# Patient Record
Sex: MALE | Race: BLACK OR AFRICAN AMERICAN | NOT HISPANIC OR LATINO | Employment: FULL TIME | URBAN - METROPOLITAN AREA
[De-identification: names, ages, dates, MRNs, and addresses within clinical notes are randomized per-mention and may not be internally consistent; named-entity substitution may affect disease eponyms.]

---

## 2023-04-05 LAB
ALANINE AMINOTRANSFERASE (SGPT) (U/L) IN SER/PLAS: NORMAL
ASPARTATE AMINOTRANSFERASE (SGOT) (U/L) IN SER/PLAS: NORMAL

## 2023-04-06 LAB
ALANINE AMINOTRANSFERASE (SGPT) (U/L) IN SER/PLAS: 33 U/L (ref 10–52)
ALBUMIN (G/DL) IN SER/PLAS: 4.3 G/DL (ref 3.4–5)
ALKALINE PHOSPHATASE (U/L) IN SER/PLAS: 67 U/L (ref 33–120)
ANION GAP IN SER/PLAS: 13 MMOL/L (ref 10–20)
ASPARTATE AMINOTRANSFERASE (SGOT) (U/L) IN SER/PLAS: 25 U/L (ref 9–39)
BILIRUBIN TOTAL (MG/DL) IN SER/PLAS: 0.5 MG/DL (ref 0–1.2)
CALCIUM (MG/DL) IN SER/PLAS: 9.6 MG/DL (ref 8.6–10.6)
CARBON DIOXIDE, TOTAL (MMOL/L) IN SER/PLAS: 27 MMOL/L (ref 21–32)
CHLORIDE (MMOL/L) IN SER/PLAS: 103 MMOL/L (ref 98–107)
CHOLESTEROL (MG/DL) IN SER/PLAS: 130 MG/DL (ref 0–199)
CHOLESTEROL IN HDL (MG/DL) IN SER/PLAS: 43.5 MG/DL
CHOLESTEROL/HDL RATIO: 3
CREATININE (MG/DL) IN SER/PLAS: 1.13 MG/DL (ref 0.5–1.3)
ESTIMATED AVERAGE GLUCOSE FOR HBA1C: 192 MG/DL
GFR MALE: 80 ML/MIN/1.73M2
GLUCOSE (MG/DL) IN SER/PLAS: 138 MG/DL (ref 74–99)
HEMOGLOBIN A1C/HEMOGLOBIN TOTAL IN BLOOD: 8.3 %
LDL: 69 MG/DL (ref 0–99)
POTASSIUM (MMOL/L) IN SER/PLAS: 3.9 MMOL/L (ref 3.5–5.3)
PROTEIN TOTAL: 7.2 G/DL (ref 6.4–8.2)
SODIUM (MMOL/L) IN SER/PLAS: 139 MMOL/L (ref 136–145)
TRIGLYCERIDE (MG/DL) IN SER/PLAS: 88 MG/DL (ref 0–149)
UREA NITROGEN (MG/DL) IN SER/PLAS: 10 MG/DL (ref 6–23)
VLDL: 18 MG/DL (ref 0–40)

## 2023-05-10 ENCOUNTER — TELEPHONE (OUTPATIENT)
Dept: PRIMARY CARE | Facility: CLINIC | Age: 48
End: 2023-05-10
Payer: COMMERCIAL

## 2023-05-22 ENCOUNTER — APPOINTMENT (OUTPATIENT)
Dept: PRIMARY CARE | Facility: CLINIC | Age: 48
End: 2023-05-22
Payer: COMMERCIAL

## 2023-07-31 DIAGNOSIS — I10 BENIGN ESSENTIAL HYPERTENSION: ICD-10-CM

## 2023-07-31 DIAGNOSIS — E08.00 DIABETES MELLITUS DUE TO UNDERLYING CONDITION WITH HYPEROSMOLARITY WITHOUT COMA, WITHOUT LONG-TERM CURRENT USE OF INSULIN (MULTI): ICD-10-CM

## 2023-07-31 DIAGNOSIS — E03.9 HYPOTHYROIDISM, UNSPECIFIED TYPE: ICD-10-CM

## 2023-07-31 PROBLEM — Z86.39 HISTORY OF HYPOTHYROIDISM: Status: ACTIVE | Noted: 2023-07-31

## 2023-07-31 PROBLEM — E78.5 HYPERLIPIDEMIA: Status: ACTIVE | Noted: 2023-07-31

## 2023-07-31 RX ORDER — LISINOPRIL 40 MG/1
40 TABLET ORAL DAILY
Qty: 30 TABLET | Refills: 0 | Status: SHIPPED | OUTPATIENT
Start: 2023-07-31 | End: 2023-08-10 | Stop reason: SDUPTHER

## 2023-07-31 RX ORDER — LISINOPRIL 40 MG/1
40 TABLET ORAL DAILY
COMMUNITY
End: 2023-07-31 | Stop reason: SDUPTHER

## 2023-07-31 RX ORDER — METFORMIN HYDROCHLORIDE 1000 MG/1
1000 TABLET ORAL 2 TIMES DAILY
Qty: 60 TABLET | Refills: 0 | Status: SHIPPED | OUTPATIENT
Start: 2023-07-31 | End: 2023-08-10 | Stop reason: SDUPTHER

## 2023-07-31 RX ORDER — LEVOTHYROXINE SODIUM 50 UG/1
50 TABLET ORAL DAILY
Qty: 30 TABLET | Refills: 0 | Status: SHIPPED | OUTPATIENT
Start: 2023-07-31 | End: 2023-08-10 | Stop reason: SDUPTHER

## 2023-08-10 ENCOUNTER — OFFICE VISIT (OUTPATIENT)
Dept: PRIMARY CARE | Facility: CLINIC | Age: 48
End: 2023-08-10
Payer: COMMERCIAL

## 2023-08-10 VITALS
DIASTOLIC BLOOD PRESSURE: 82 MMHG | BODY MASS INDEX: 31.52 KG/M2 | SYSTOLIC BLOOD PRESSURE: 130 MMHG | WEIGHT: 208 LBS | HEIGHT: 68 IN

## 2023-08-10 DIAGNOSIS — E03.9 HYPOTHYROIDISM, UNSPECIFIED TYPE: ICD-10-CM

## 2023-08-10 DIAGNOSIS — R73.03 PRE-DIABETES: ICD-10-CM

## 2023-08-10 DIAGNOSIS — M54.9 BACK PAIN, UNSPECIFIED BACK LOCATION, UNSPECIFIED BACK PAIN LATERALITY, UNSPECIFIED CHRONICITY: ICD-10-CM

## 2023-08-10 DIAGNOSIS — Z12.5 ENCOUNTER FOR PROSTATE CANCER SCREENING: ICD-10-CM

## 2023-08-10 DIAGNOSIS — I10 BENIGN ESSENTIAL HYPERTENSION: ICD-10-CM

## 2023-08-10 DIAGNOSIS — E08.00 DIABETES MELLITUS DUE TO UNDERLYING CONDITION WITH HYPEROSMOLARITY WITHOUT COMA, WITHOUT LONG-TERM CURRENT USE OF INSULIN (MULTI): ICD-10-CM

## 2023-08-10 DIAGNOSIS — E78.5 HYPERLIPIDEMIA, UNSPECIFIED HYPERLIPIDEMIA TYPE: ICD-10-CM

## 2023-08-10 DIAGNOSIS — Z86.39 HISTORY OF HYPOTHYROIDISM: ICD-10-CM

## 2023-08-10 LAB
ALANINE AMINOTRANSFERASE (SGPT) (U/L) IN SER/PLAS: 30 U/L (ref 10–52)
ALBUMIN (G/DL) IN SER/PLAS: 4.4 G/DL (ref 3.4–5)
ALKALINE PHOSPHATASE (U/L) IN SER/PLAS: 59 U/L (ref 33–120)
ANION GAP IN SER/PLAS: 13 MMOL/L (ref 10–20)
APPEARANCE, URINE: CLEAR
ASPARTATE AMINOTRANSFERASE (SGOT) (U/L) IN SER/PLAS: 26 U/L (ref 9–39)
BILIRUBIN TOTAL (MG/DL) IN SER/PLAS: 0.8 MG/DL (ref 0–1.2)
BILIRUBIN, URINE: NEGATIVE
BLOOD, URINE: NEGATIVE
CALCIUM (MG/DL) IN SER/PLAS: 9.6 MG/DL (ref 8.6–10.6)
CARBON DIOXIDE, TOTAL (MMOL/L) IN SER/PLAS: 25 MMOL/L (ref 21–32)
CHLORIDE (MMOL/L) IN SER/PLAS: 106 MMOL/L (ref 98–107)
CHOLESTEROL (MG/DL) IN SER/PLAS: 123 MG/DL (ref 0–199)
CHOLESTEROL IN HDL (MG/DL) IN SER/PLAS: 46.1 MG/DL
CHOLESTEROL/HDL RATIO: 2.7
COLOR, URINE: YELLOW
CREATININE (MG/DL) IN SER/PLAS: 0.94 MG/DL (ref 0.5–1.3)
GFR MALE: >90 ML/MIN/1.73M2
GLUCOSE (MG/DL) IN SER/PLAS: 117 MG/DL (ref 74–99)
GLUCOSE, URINE: ABNORMAL MG/DL
KETONES, URINE: NEGATIVE MG/DL
LDL: 66 MG/DL (ref 0–99)
LEUKOCYTE ESTERASE, URINE: NEGATIVE
NITRITE, URINE: NEGATIVE
PH, URINE: 5 (ref 5–8)
POTASSIUM (MMOL/L) IN SER/PLAS: 3.8 MMOL/L (ref 3.5–5.3)
PROSTATE SPECIFIC AG (NG/ML) IN SER/PLAS: 1.94 NG/ML (ref 0–4)
PROTEIN TOTAL: 7.4 G/DL (ref 6.4–8.2)
PROTEIN, URINE: NEGATIVE MG/DL
SODIUM (MMOL/L) IN SER/PLAS: 140 MMOL/L (ref 136–145)
SPECIFIC GRAVITY, URINE: 1.02 (ref 1–1.03)
THYROTROPIN (MIU/L) IN SER/PLAS BY DETECTION LIMIT <= 0.05 MIU/L: 1.39 MIU/L (ref 0.44–3.98)
TRIGLYCERIDE (MG/DL) IN SER/PLAS: 57 MG/DL (ref 0–149)
UREA NITROGEN (MG/DL) IN SER/PLAS: 9 MG/DL (ref 6–23)
UROBILINOGEN, URINE: <2 MG/DL (ref 0–1.9)
VLDL: 11 MG/DL (ref 0–40)

## 2023-08-10 PROCEDURE — 3075F SYST BP GE 130 - 139MM HG: CPT | Performed by: INTERNAL MEDICINE

## 2023-08-10 PROCEDURE — 99213 OFFICE O/P EST LOW 20 MIN: CPT | Performed by: INTERNAL MEDICINE

## 2023-08-10 PROCEDURE — 3052F HG A1C>EQUAL 8.0%<EQUAL 9.0%: CPT | Performed by: INTERNAL MEDICINE

## 2023-08-10 PROCEDURE — 3079F DIAST BP 80-89 MM HG: CPT | Performed by: INTERNAL MEDICINE

## 2023-08-10 PROCEDURE — 4010F ACE/ARB THERAPY RXD/TAKEN: CPT | Performed by: INTERNAL MEDICINE

## 2023-08-10 RX ORDER — METFORMIN HYDROCHLORIDE 1000 MG/1
1000 TABLET ORAL 2 TIMES DAILY
Qty: 180 TABLET | Refills: 0 | Status: SHIPPED | OUTPATIENT
Start: 2023-08-10 | End: 2024-02-03 | Stop reason: SDUPTHER

## 2023-08-10 RX ORDER — CYCLOBENZAPRINE HCL 10 MG
10 TABLET ORAL NIGHTLY PRN
Qty: 90 TABLET | Refills: 1 | Status: SHIPPED | OUTPATIENT
Start: 2023-08-10 | End: 2024-02-06

## 2023-08-10 RX ORDER — PREDNISONE 10 MG/1
10 TABLET ORAL DAILY
Qty: 21 TABLET | Refills: 0 | Status: SHIPPED | OUTPATIENT
Start: 2023-08-10 | End: 2024-01-10 | Stop reason: ALTCHOICE

## 2023-08-10 RX ORDER — LISINOPRIL 40 MG/1
40 TABLET ORAL DAILY
Qty: 90 TABLET | Refills: 0 | Status: SHIPPED | OUTPATIENT
Start: 2023-08-10 | End: 2024-02-03 | Stop reason: SDUPTHER

## 2023-08-10 RX ORDER — NABUMETONE 750 MG/1
750 TABLET, FILM COATED ORAL 2 TIMES DAILY
Qty: 60 TABLET | Refills: 5 | Status: SHIPPED | OUTPATIENT
Start: 2023-08-10 | End: 2024-01-10 | Stop reason: ALTCHOICE

## 2023-08-10 RX ORDER — LEVOTHYROXINE SODIUM 50 UG/1
50 TABLET ORAL DAILY
Qty: 90 TABLET | Refills: 0 | Status: SHIPPED | OUTPATIENT
Start: 2023-08-10 | End: 2023-09-12 | Stop reason: SDUPTHER

## 2023-08-10 ASSESSMENT — ENCOUNTER SYMPTOMS
DEPRESSION: 0
OCCASIONAL FEELINGS OF UNSTEADINESS: 0
LOSS OF SENSATION IN FEET: 0

## 2023-08-10 NOTE — PROGRESS NOTES
OFFICE NOTE    NAME OF THE PATIENT: Buster Sims    YOB: 1975    CHIEF COMPLAINT:  This gentleman today came here for multiple medical issues.  Good news is that he lost weight on Ozempic.  Bad news is that his back pain flared up.  Severe back pain going to the right leg, tingling and numbness.  He came here for follow-up on various conditions.  Weak, tired, fatigued, and exhausted.  No loss of control in urine and bowel.  He came here for follow-up on various conditions.    PAST MEDICAL HISTORY:  Reviewed on EMR, unchanged.    CURRENT MEDICATIONS:  Reviewed on EMR, unchanged.  List reviewed.    ALLERGIES:  Reviewed on EMR, unchanged.    SOCIAL HISTORY:  Reviewed on EMR, unchanged.  He does not smoke and does not drink alcohol.    FAMILY HISTORY:  Reviewed on EMR, unchanged.    REVIEW OF SYSTEMS:  All 12 systems reviewed and pertaining covered in history and physical.    PHYSICAL EXAMINATION  VITAL SIGNS:  As recorded and reviewed from EMR.  EYES:  The patient's sclerae white.  The pupils were equal and round.  ENT:  The patient's external ears were normal and the otoscopic examination was negative.  RESPIRATORY:  The patient had normal inspirations and expirations.  The breath sounds were equal bilaterally and clear to auscultation.  CARDIOVASCULAR:  The patient had S1 normal, split S2 without obvious rubs, clicks, or murmurs.    GASTROINTESTINAL:  There was no hepatosplenomegaly.  There were no palpable masses and no inguinal nodes.  EXTREMITIES:  Legs had no edema.  NEUROLOGIC:  The patient had normal cranial nerves.  The reflexes, sensory, and motor examination were grossly within normal limits.  BACK:  Movements painful.  Straight leg raise limited.    LAB WORK:  Laboratory testing discussed.    ASSESSMENT AND PLAN:  Back pain with lumbar radiculopathy.  Advised Relafen, Flexeril, physical therapy and tapering prednisone.  Type 2 diabetes.  Ozempic did a good job.  Lost  "weight.  Hypertension, okay.  High cholesterol, okay.  Today's blood work, we will communicate over the phone.  If necessary, I will see him in four to six weeks.  Otherwise, routine check in three months.  Prednisone will make sugar go up.      Kindly review this note in conjunction with EMR.     Subjective   Patient ID: Buster Sims is a 47 y.o. male who presents for Follow-up.      HPI    Review of Systems    Objective   /82   Ht 1.727 m (5' 8\")   Wt 94.3 kg (208 lb)   BMI 31.63 kg/m²       Physical Exam    Assessment/Plan   Problem List Items Addressed This Visit       Hyperlipidemia    Relevant Orders    Comprehensive metabolic panel    Lipid panel    History of hypothyroidism    Relevant Orders    TSH    Benign essential hypertension    Relevant Orders    CBC    Urinalysis with Reflex Microscopic     Other Visit Diagnoses       Encounter for prostate cancer screening        Relevant Orders    Prostate Spec.Ag,Screen    Pre-diabetes        Relevant Orders    Hemoglobin A1c    Diabetes mellitus due to underlying condition with hyperosmolarity without coma, without long-term current use of insulin (CMS/[facility])        Hypothyroidism, unspecified type                  "

## 2023-08-11 LAB
ERYTHROCYTE DISTRIBUTION WIDTH (RATIO) BY AUTOMATED COUNT: 14.8 % (ref 11.5–14.5)
ERYTHROCYTE MEAN CORPUSCULAR HEMOGLOBIN CONCENTRATION (G/DL) BY AUTOMATED: 32.3 G/DL (ref 32–36)
ERYTHROCYTE MEAN CORPUSCULAR VOLUME (FL) BY AUTOMATED COUNT: 88 FL (ref 80–100)
ERYTHROCYTES (10*6/UL) IN BLOOD BY AUTOMATED COUNT: 5.32 X10E12/L (ref 4.5–5.9)
ESTIMATED AVERAGE GLUCOSE FOR HBA1C: 174 MG/DL
HEMATOCRIT (%) IN BLOOD BY AUTOMATED COUNT: 46.7 % (ref 41–52)
HEMOGLOBIN (G/DL) IN BLOOD: 15.1 G/DL (ref 13.5–17.5)
HEMOGLOBIN A1C/HEMOGLOBIN TOTAL IN BLOOD: 7.7 %
LEUKOCYTES (10*3/UL) IN BLOOD BY AUTOMATED COUNT: 8.7 X10E9/L (ref 4.4–11.3)
NRBC (PER 100 WBCS) BY AUTOMATED COUNT: 0 /100 WBC (ref 0–0)
PLATELETS (10*3/UL) IN BLOOD AUTOMATED COUNT: 270 X10E9/L (ref 150–450)

## 2023-09-12 DIAGNOSIS — E03.9 HYPOTHYROIDISM, UNSPECIFIED TYPE: ICD-10-CM

## 2023-09-12 RX ORDER — LEVOTHYROXINE SODIUM 50 UG/1
50 TABLET ORAL DAILY
Qty: 90 TABLET | Refills: 0 | Status: SHIPPED | OUTPATIENT
Start: 2023-09-12 | End: 2024-02-03 | Stop reason: SDUPTHER

## 2023-10-04 ENCOUNTER — TREATMENT (OUTPATIENT)
Dept: PHYSICAL THERAPY | Facility: CLINIC | Age: 48
End: 2023-10-04
Payer: COMMERCIAL

## 2023-10-04 DIAGNOSIS — M54.50 CHRONIC BILATERAL LOW BACK PAIN WITHOUT SCIATICA: Primary | ICD-10-CM

## 2023-10-04 DIAGNOSIS — G89.29 CHRONIC BILATERAL LOW BACK PAIN WITHOUT SCIATICA: Primary | ICD-10-CM

## 2023-10-04 PROBLEM — M54.9 BACK PAIN: Status: ACTIVE | Noted: 2023-10-04

## 2023-10-04 PROCEDURE — 97110 THERAPEUTIC EXERCISES: CPT | Mod: GP | Performed by: PHYSICAL THERAPIST

## 2023-10-04 NOTE — PROGRESS NOTES
Physical Therapy    Physical Therapy Treatment    Patient Name: Buster Sims  MRN: [id number]  Today's Date: 10/4/2023  Visit #: 3    Time Calculation  Start Time: 0520  Stop Time: 0600  Time Calculation (min): 40 min      Assessment: Patient with decreasing radicular pain.   Patient able to progress exercises without pain or difficutly.         Plan: Continue with POC.        Current Problem  1. Chronic bilateral low back pain without sciatica            Subjective      Precautions: HTN, DM     Pain:    5/10 at worst last 2 days   Worse first thing in the am and end of work day  Feels better after doing stretches   Less pain down leg    Objective       Treatments:  Therapeutic exercises:  SciFit stepper 20X   Calf stretch of step 10X   Resisted shoulder retraction and extension with green threaband 20X each  Hamstring stretch with strap 10X  HLR 20X  Prone press ups 20X   Prone leg lifts, arm lifts and opposite arm/leg lifts 10X each  (40 minutes)             Goals: We are continuing the therapy plan of care and goals that were documented in the legacy EMR.  Please refer to the PT (or OT) plan of care in the EMR for treatment plan and goals

## 2023-10-10 PROBLEM — I25.10 ATHEROSCLEROSIS OF NATIVE CORONARY ARTERY OF NATIVE HEART WITHOUT ANGINA PECTORIS: Status: ACTIVE | Noted: 2023-10-10

## 2023-10-10 PROBLEM — E11.65 POORLY CONTROLLED TYPE 2 DIABETES MELLITUS (MULTI): Status: ACTIVE | Noted: 2023-10-10

## 2023-10-10 PROBLEM — R37 SEXUAL DYSFUNCTION: Status: ACTIVE | Noted: 2023-10-10

## 2023-10-10 PROBLEM — M25.561 KNEE PAIN, BILATERAL: Status: ACTIVE | Noted: 2023-10-10

## 2023-10-10 PROBLEM — E78.00 LOW-DENSITY-LIPOID-TYPE (LDL) HYPERLIPOPROTEINEMIA: Status: ACTIVE | Noted: 2023-10-10

## 2023-10-10 PROBLEM — R00.2 PALPITATION: Status: ACTIVE | Noted: 2023-10-10

## 2023-10-10 PROBLEM — M25.562 KNEE PAIN, BILATERAL: Status: ACTIVE | Noted: 2023-10-10

## 2023-10-10 PROBLEM — M25.569 JOINT PAIN, KNEE: Status: ACTIVE | Noted: 2023-10-10

## 2023-10-10 PROBLEM — E66.9 CLASS 1 OBESITY WITH BODY MASS INDEX (BMI) OF 32.0 TO 32.9 IN ADULT: Status: ACTIVE | Noted: 2023-10-10

## 2023-10-10 PROBLEM — R01.1 HEART MURMUR: Status: ACTIVE | Noted: 2023-10-10

## 2023-10-10 PROBLEM — E66.811 CLASS 1 OBESITY WITH BODY MASS INDEX (BMI) OF 32.0 TO 32.9 IN ADULT: Status: ACTIVE | Noted: 2023-10-10

## 2023-10-10 PROBLEM — M79.604 RIGHT LEG PAIN: Status: ACTIVE | Noted: 2023-10-10

## 2023-10-10 PROBLEM — R93.1 ABNORMAL HEART SCORE CT: Status: ACTIVE | Noted: 2023-10-10

## 2023-10-10 PROBLEM — E34.9 TESTOSTERONE INSUFFICIENCY: Status: ACTIVE | Noted: 2023-10-10

## 2023-10-10 RX ORDER — ATORVASTATIN CALCIUM 20 MG/1
TABLET, FILM COATED ORAL
COMMUNITY
Start: 2022-04-20 | End: 2024-01-10 | Stop reason: ALTCHOICE

## 2023-10-10 RX ORDER — GLIMEPIRIDE 4 MG/1
1 TABLET ORAL 2 TIMES DAILY
COMMUNITY
Start: 2017-02-22

## 2023-10-10 RX ORDER — ATORVASTATIN CALCIUM 40 MG/1
20 TABLET, FILM COATED ORAL DAILY
COMMUNITY
Start: 2022-04-20

## 2023-10-10 RX ORDER — EZETIMIBE 10 MG/1
1 TABLET ORAL NIGHTLY
COMMUNITY
Start: 2022-03-28 | End: 2024-04-15

## 2023-10-10 RX ORDER — SEMAGLUTIDE 1.34 MG/ML
INJECTION, SOLUTION SUBCUTANEOUS
COMMUNITY
End: 2023-12-21 | Stop reason: WASHOUT

## 2023-10-10 RX ORDER — SILDENAFIL 100 MG/1
100 TABLET, FILM COATED ORAL AS NEEDED
COMMUNITY

## 2023-10-10 RX ORDER — NAPROXEN SODIUM 220 MG/1
1 TABLET, FILM COATED ORAL DAILY
COMMUNITY
Start: 2022-04-20

## 2023-10-10 RX ORDER — METOPROLOL TARTRATE 25 MG/1
1 TABLET, FILM COATED ORAL 2 TIMES DAILY
COMMUNITY
Start: 2022-03-28 | End: 2024-04-12 | Stop reason: SDUPTHER

## 2023-10-10 RX ORDER — EMPAGLIFLOZIN 25 MG/1
1 TABLET, FILM COATED ORAL DAILY
COMMUNITY
Start: 2020-06-13 | End: 2024-04-28

## 2023-10-10 RX ORDER — SEMAGLUTIDE 2.68 MG/ML
2 INJECTION, SOLUTION SUBCUTANEOUS
COMMUNITY
Start: 2019-03-22 | End: 2023-12-21 | Stop reason: WASHOUT

## 2023-10-10 RX ORDER — SIMVASTATIN 40 MG/1
40 TABLET, FILM COATED ORAL NIGHTLY
COMMUNITY
End: 2024-01-10 | Stop reason: ALTCHOICE

## 2023-10-11 ENCOUNTER — TREATMENT (OUTPATIENT)
Dept: PHYSICAL THERAPY | Facility: CLINIC | Age: 48
End: 2023-10-11
Payer: COMMERCIAL

## 2023-10-11 DIAGNOSIS — G89.29 CHRONIC BILATERAL LOW BACK PAIN WITHOUT SCIATICA: Primary | ICD-10-CM

## 2023-10-11 DIAGNOSIS — M54.50 CHRONIC BILATERAL LOW BACK PAIN WITHOUT SCIATICA: Primary | ICD-10-CM

## 2023-10-11 PROCEDURE — 97110 THERAPEUTIC EXERCISES: CPT | Mod: GP | Performed by: PHYSICAL THERAPIST

## 2023-10-11 NOTE — PROGRESS NOTES
Physical Therapy    Physical Therapy Treatment    Patient Name: Buster Sims  MRN: [id number]  Today's Date: 10/11/2023  Visit #: 4       Assessment:  Patient with increase radicular symptoms this week.          Plan: Continue with POC.   Reassess next visit        Current Problem  1. Chronic bilateral low back pain without sciatica            Subjective      Precautions: HTN, DM     Pain:    6-7/10 at worst last 2 days - pain bilateral evertors - spasms   No aggravating factor   Feels better after doing stretches   Working second job at [employer]      Objective       Treatments:  Therapeutic exercises:  SciFit stepper 10 minutes  Step stretch 20X   Calf stretch of step 10X   Resisted shoulder retraction and extension with green threaband 20X each  Hamstring stretch with strap 10X  Gluteal stretch with strap 10X  HLR 20X  Theraball KTC with strap 20X   Prone press ups 20X   (45 minutes)             Goals: We are continuing the therapy plan of care and goals that were documented in the legacy EMR.  Please refer to the PT (or OT) plan of care in the EMR for treatment plan and goals

## 2023-10-18 ENCOUNTER — APPOINTMENT (OUTPATIENT)
Dept: PHYSICAL THERAPY | Facility: CLINIC | Age: 48
End: 2023-10-18
Payer: COMMERCIAL

## 2023-12-20 ENCOUNTER — APPOINTMENT (OUTPATIENT)
Dept: CARDIOLOGY | Facility: HOSPITAL | Age: 48
End: 2023-12-20
Payer: COMMERCIAL

## 2023-12-21 ENCOUNTER — OFFICE VISIT (OUTPATIENT)
Dept: ENDOCRINOLOGY | Facility: CLINIC | Age: 48
End: 2023-12-21
Payer: COMMERCIAL

## 2023-12-21 VITALS
HEART RATE: 79 BPM | DIASTOLIC BLOOD PRESSURE: 92 MMHG | RESPIRATION RATE: 20 BRPM | SYSTOLIC BLOOD PRESSURE: 140 MMHG | WEIGHT: 213.41 LBS | BODY MASS INDEX: 29.88 KG/M2 | HEIGHT: 71 IN

## 2023-12-21 DIAGNOSIS — E11.9 TYPE 2 DIABETES MELLITUS WITHOUT COMPLICATION, WITHOUT LONG-TERM CURRENT USE OF INSULIN (MULTI): Primary | ICD-10-CM

## 2023-12-21 PROCEDURE — 99214 OFFICE O/P EST MOD 30 MIN: CPT | Performed by: INTERNAL MEDICINE

## 2023-12-21 PROCEDURE — 3080F DIAST BP >= 90 MM HG: CPT | Performed by: INTERNAL MEDICINE

## 2023-12-21 PROCEDURE — 1036F TOBACCO NON-USER: CPT | Performed by: INTERNAL MEDICINE

## 2023-12-21 PROCEDURE — 4010F ACE/ARB THERAPY RXD/TAKEN: CPT | Performed by: INTERNAL MEDICINE

## 2023-12-21 PROCEDURE — 3077F SYST BP >= 140 MM HG: CPT | Performed by: INTERNAL MEDICINE

## 2023-12-21 PROCEDURE — 3051F HG A1C>EQUAL 7.0%<8.0%: CPT | Performed by: INTERNAL MEDICINE

## 2023-12-21 RX ORDER — SEMAGLUTIDE 2.68 MG/ML
2 INJECTION, SOLUTION SUBCUTANEOUS
Qty: 9 ML | Refills: 3 | Status: SHIPPED | OUTPATIENT
Start: 2023-12-21 | End: 2024-12-20

## 2023-12-21 ASSESSMENT — COLUMBIA-SUICIDE SEVERITY RATING SCALE - C-SSRS
1. IN THE PAST MONTH, HAVE YOU WISHED YOU WERE DEAD OR WISHED YOU COULD GO TO SLEEP AND NOT WAKE UP?: NO
2. HAVE YOU ACTUALLY HAD ANY THOUGHTS OF KILLING YOURSELF?: NO
6. HAVE YOU EVER DONE ANYTHING, STARTED TO DO ANYTHING, OR PREPARED TO DO ANYTHING TO END YOUR LIFE?: NO

## 2023-12-21 ASSESSMENT — ENCOUNTER SYMPTOMS
NAUSEA: 0
COUGH: 0
DIARRHEA: 0
VOMITING: 0
UNEXPECTED WEIGHT CHANGE: 0
ENDOCRINE COMMENTS: AS ABOVE

## 2023-12-21 ASSESSMENT — PATIENT HEALTH QUESTIONNAIRE - PHQ9
SUM OF ALL RESPONSES TO PHQ9 QUESTIONS 1 AND 2: 0
2. FEELING DOWN, DEPRESSED OR HOPELESS: NOT AT ALL
1. LITTLE INTEREST OR PLEASURE IN DOING THINGS: NOT AT ALL

## 2023-12-21 ASSESSMENT — PAIN SCALES - GENERAL: PAINLEVEL: 0-NO PAIN

## 2023-12-21 NOTE — PATIENT INSTRUCTIONS
RECOMMENDATIONS  Labs as ordered by Dr. Fulton  Follow up 6 months  Results available on [facility]  Call/message if you have not received results in 3 days.     Bring written glucose record (2 weeks' worth) to all appointments.   Repeat A1c before next appointment

## 2023-12-21 NOTE — PROGRESS NOTES
History Of Present Illness  Buster Sims is a 48 y.o. male     Duration of type 2 diabetes mellitus:  19 years  Complications:  cardiovascular disease    Glimepiride 4 mg BID  Metformin 1000 mg BID  Ozempic 2 mg/week  Jardiance 25 mg/day    Patient is testing glucose 2 times daily  Records reviewed on phone, no written record    Last eye exam:  August 2023    Past Medical History  He has a past medical history of Encounter for screening for malignant neoplasm of prostate (04/18/2018), Infectious gastroenteritis and colitis, unspecified (04/16/2016), Other conditions influencing health status (12/16/2016), Pain in left shoulder (12/16/2016), Personal history of other (healed) physical injury and trauma (03/27/2015), Personal history of other diseases of the respiratory system (09/19/2015), and Type 2 diabetes mellitus with unspecified complications (CMS/[facility]) (05/18/2013).    Surgical History  He has a past surgical history that includes Other surgical history (05/18/2013).     Social History  He reports that he has never smoked. He has never used smokeless tobacco. He reports that he does not drink alcohol and does not use drugs.    Family History  Family History   Problem Relation Name Age of Onset    Diabetes Mother      Hyperlipidemia Mother      Hypertension Mother      Hypertension Father      Diabetes Father      Hyperlipidemia Father      Arthritis Paternal Grandmother         Medications  Current Outpatient Medications   Medication Instructions    aspirin 81 mg chewable tablet 1 tablet, oral, Daily    atorvastatin (Lipitor) 20 mg tablet oral    atorvastatin (LIPITOR) 40 mg, oral, Nightly    cyclobenzaprine (FLEXERIL) 10 mg, oral, Nightly PRN    ezetimibe (Zetia) 10 mg tablet 1 tablet, oral, Nightly    glimepiride (Amaryl) 4 mg tablet 1 tablet, oral, 2 times daily    Jardiance 25 mg 1 tablet, oral, Daily    levothyroxine (SYNTHROID, LEVOXYL) 50 mcg, oral, Daily    lisinopril 40 mg, oral, Daily     "metFORMIN (GLUCOPHAGE) 1,000 mg, oral, 2 times daily    metoprolol tartrate (Lopressor) 25 mg tablet 1 tablet, oral, 2 times daily    nabumetone (RELAFEN) 750 mg, oral, 2 times daily    Ozempic 2 mg, subcutaneous, Weekly    predniSONE (DELTASONE) 10 mg, oral, Daily    semaglutide (Ozempic) 1 mg/dose (4 mg/3 mL) pen injector subcutaneous, Weekly    sildenafil (VIAGRA) 100 mg, oral, As needed    simvastatin (ZOCOR) 40 mg, oral, Nightly       Allergies  Oxycodone-acetaminophen    Review of Systems   Constitutional:  Negative for unexpected weight change.   Eyes:  Negative for visual disturbance.   Respiratory:  Negative for cough.    Gastrointestinal:  Negative for diarrhea, nausea and vomiting.   Endocrine:        As above         Last Recorded Vitals  Blood pressure (!) 140/92, pulse 79, resp. rate 20, height 1.803 m (5' 11\"), weight 96.8 kg (213 lb 6.5 oz).    Physical Exam  Constitutional:       General: He is not in acute distress.  HENT:      Head: Normocephalic.      Mouth/Throat:      Mouth: Mucous membranes are moist.   Eyes:      Extraocular Movements: Extraocular movements intact.   Neck:      Thyroid: No thyromegaly.   Cardiovascular:      Pulses:           Radial pulses are 2+ on the right side and 2+ on the left side.        Dorsalis pedis pulses are 2+ on the right side and 2+ on the left side.   Musculoskeletal:      Right lower leg: No edema.      Left lower leg: No edema.   Feet:      Comments: Flat feet.  Callus formation at 1st toes bilat  Neurological:      Mental Status: He is alert.      Motor: No tremor.   Psychiatric:         Mood and Affect: Affect normal.          Relevant Results  Glucose (mg/dL)   Date Value   08/10/2023 117 (H)   04/05/2023 138 (H)   12/19/2022 159 (H)     Hemoglobin A1C (%)   Date Value   08/10/2023 7.7 (A)   04/05/2023 8.3 (A)   12/19/2022 9.0 (A)     Bicarbonate (mmol/L)   Date Value   08/10/2023 25   04/05/2023 27   12/19/2022 26     Urea Nitrogen (mg/dL)   Date Value "   08/10/2023 9   04/05/2023 10   12/19/2022 11     Creatinine (mg/dL)   Date Value   08/10/2023 0.94   04/05/2023 1.13   12/19/2022 1.06     Lab Results   Component Value Date    CHOL 123 08/10/2023    CHOL 130 04/05/2023    CHOL 141 12/19/2022     Lab Results   Component Value Date    HDL 46.1 08/10/2023    HDL 43.5 04/05/2023    HDL 44.5 12/19/2022     Lab Results   Component Value Date    TRIG 57 08/10/2023    TRIG 88 04/05/2023    TRIG 94 12/19/2022     Lab Results   Component Value Date    TSH 1.39 08/10/2023       IMPRESSION  TYPE 2 DIABETES MELLITUS  Glucose control improving  Some post-breakfast hyperglycemia  Anticipate A1c testing per Dr. Fulton.     RECOMMENDATIONS  Labs as ordered by Dr. Fulton  Follow up 6 months  Results available on [facility]  Call/message if you have not received results in 3 days.     Bring written glucose record (2 weeks' worth) to all appointments.   Repeat A1c before next appointment

## 2024-01-08 ENCOUNTER — LAB (OUTPATIENT)
Dept: LAB | Facility: LAB | Age: 49
End: 2024-01-08
Payer: COMMERCIAL

## 2024-01-08 DIAGNOSIS — E08.00 DIABETES MELLITUS DUE TO UNDERLYING CONDITION WITH HYPEROSMOLARITY WITHOUT COMA, WITHOUT LONG-TERM CURRENT USE OF INSULIN (MULTI): ICD-10-CM

## 2024-01-08 DIAGNOSIS — I10 BENIGN ESSENTIAL HYPERTENSION: ICD-10-CM

## 2024-01-08 DIAGNOSIS — E78.5 HYPERLIPIDEMIA, UNSPECIFIED HYPERLIPIDEMIA TYPE: ICD-10-CM

## 2024-01-08 DIAGNOSIS — Z86.39 HISTORY OF HYPOTHYROIDISM: ICD-10-CM

## 2024-01-08 DIAGNOSIS — R73.03 PRE-DIABETES: ICD-10-CM

## 2024-01-08 DIAGNOSIS — Z12.5 ENCOUNTER FOR PROSTATE CANCER SCREENING: ICD-10-CM

## 2024-01-08 LAB
BASOPHILS # BLD AUTO: 0.06 X10*3/UL (ref 0–0.1)
BASOPHILS NFR BLD AUTO: 0.6 %
EOSINOPHIL # BLD AUTO: 0.03 X10*3/UL (ref 0–0.7)
EOSINOPHIL NFR BLD AUTO: 0.3 %
ERYTHROCYTE [DISTWIDTH] IN BLOOD BY AUTOMATED COUNT: 13.6 % (ref 11.5–14.5)
EST. AVERAGE GLUCOSE BLD GHB EST-MCNC: 171 MG/DL
HBA1C MFR BLD: 7.6 %
HCT VFR BLD AUTO: 46.2 % (ref 41–52)
HGB BLD-MCNC: 16 G/DL (ref 13.5–17.5)
IMM GRANULOCYTES # BLD AUTO: 0.03 X10*3/UL (ref 0–0.7)
IMM GRANULOCYTES NFR BLD AUTO: 0.3 % (ref 0–0.9)
LYMPHOCYTES # BLD AUTO: 2.66 X10*3/UL (ref 1.2–4.8)
LYMPHOCYTES NFR BLD AUTO: 27.3 %
MCH RBC QN AUTO: 28.6 PG (ref 26–34)
MCHC RBC AUTO-ENTMCNC: 34.6 G/DL (ref 32–36)
MCV RBC AUTO: 83 FL (ref 80–100)
MONOCYTES # BLD AUTO: 0.58 X10*3/UL (ref 0.1–1)
MONOCYTES NFR BLD AUTO: 5.9 %
NEUTROPHILS # BLD AUTO: 6.4 X10*3/UL (ref 1.2–7.7)
NEUTROPHILS NFR BLD AUTO: 65.6 %
NRBC BLD-RTO: 0 /100 WBCS (ref 0–0)
PLATELET # BLD AUTO: 259 X10*3/UL (ref 150–450)
RBC # BLD AUTO: 5.59 X10*6/UL (ref 4.5–5.9)
WBC # BLD AUTO: 9.8 X10*3/UL (ref 4.4–11.3)

## 2024-01-08 PROCEDURE — 84153 ASSAY OF PSA TOTAL: CPT

## 2024-01-08 PROCEDURE — 81003 URINALYSIS AUTO W/O SCOPE: CPT

## 2024-01-08 PROCEDURE — 83036 HEMOGLOBIN GLYCOSYLATED A1C: CPT

## 2024-01-08 PROCEDURE — 84443 ASSAY THYROID STIM HORMONE: CPT

## 2024-01-08 PROCEDURE — 82465 ASSAY BLD/SERUM CHOLESTEROL: CPT

## 2024-01-08 PROCEDURE — 80053 COMPREHEN METABOLIC PANEL: CPT

## 2024-01-08 PROCEDURE — 80061 LIPID PANEL: CPT

## 2024-01-08 PROCEDURE — 85025 COMPLETE CBC W/AUTO DIFF WBC: CPT

## 2024-01-08 PROCEDURE — 36415 COLL VENOUS BLD VENIPUNCTURE: CPT

## 2024-01-08 PROCEDURE — 83718 ASSAY OF LIPOPROTEIN: CPT

## 2024-01-09 LAB
ALBUMIN SERPL BCP-MCNC: 4.5 G/DL (ref 3.4–5)
ALP SERPL-CCNC: 60 U/L (ref 33–120)
ALT SERPL W P-5'-P-CCNC: 27 U/L (ref 10–52)
ANION GAP SERPL CALC-SCNC: 13 MMOL/L (ref 10–20)
APPEARANCE UR: CLEAR
AST SERPL W P-5'-P-CCNC: 23 U/L (ref 9–39)
BILIRUB SERPL-MCNC: 0.7 MG/DL (ref 0–1.2)
BILIRUB UR STRIP.AUTO-MCNC: NEGATIVE MG/DL
BUN SERPL-MCNC: 9 MG/DL (ref 6–23)
CALCIUM SERPL-MCNC: 9.6 MG/DL (ref 8.6–10.6)
CHLORIDE SERPL-SCNC: 102 MMOL/L (ref 98–107)
CHOLEST SERPL-MCNC: 107 MG/DL (ref 0–199)
CHOLEST SERPL-MCNC: 107 MG/DL (ref 0–199)
CHOLESTEROL/HDL RATIO: 2.4
CHOLESTEROL/HDL RATIO: 2.4
CO2 SERPL-SCNC: 27 MMOL/L (ref 21–32)
COLOR UR: YELLOW
CREAT SERPL-MCNC: 0.98 MG/DL (ref 0.5–1.3)
EGFRCR SERPLBLD CKD-EPI 2021: >90 ML/MIN/1.73M*2
GLUCOSE SERPL-MCNC: 104 MG/DL (ref 74–99)
GLUCOSE UR STRIP.AUTO-MCNC: ABNORMAL MG/DL
HDLC SERPL-MCNC: 44.2 MG/DL
HDLC SERPL-MCNC: 44.2 MG/DL
KETONES UR STRIP.AUTO-MCNC: ABNORMAL MG/DL
LDLC SERPL CALC-MCNC: 54 MG/DL
LEUKOCYTE ESTERASE UR QL STRIP.AUTO: NEGATIVE
NITRITE UR QL STRIP.AUTO: NEGATIVE
NON HDL CHOLESTEROL: 63 MG/DL (ref 0–149)
NON-HDL CHOLESTEROL: 63 MG/DL (ref 0–149)
PH UR STRIP.AUTO: 5 [PH]
POTASSIUM SERPL-SCNC: 3.8 MMOL/L (ref 3.5–5.3)
PROT SERPL-MCNC: 7.2 G/DL (ref 6.4–8.2)
PROT UR STRIP.AUTO-MCNC: NEGATIVE MG/DL
PSA SERPL-MCNC: 1.62 NG/ML
RBC # UR STRIP.AUTO: NEGATIVE /UL
SODIUM SERPL-SCNC: 138 MMOL/L (ref 136–145)
SP GR UR STRIP.AUTO: 1.02
TRIGL SERPL-MCNC: 45 MG/DL (ref 0–149)
TSH SERPL-ACNC: 1.14 MIU/L (ref 0.44–3.98)
UROBILINOGEN UR STRIP.AUTO-MCNC: <2 MG/DL
VLDL: 9 MG/DL (ref 0–40)

## 2024-01-10 ENCOUNTER — DOCUMENTATION (OUTPATIENT)
Dept: PHYSICAL THERAPY | Facility: CLINIC | Age: 49
End: 2024-01-10
Payer: COMMERCIAL

## 2024-01-10 ENCOUNTER — OFFICE VISIT (OUTPATIENT)
Dept: CARDIOLOGY | Facility: HOSPITAL | Age: 49
End: 2024-01-10
Payer: COMMERCIAL

## 2024-01-10 VITALS
OXYGEN SATURATION: 94 % | HEART RATE: 82 BPM | HEIGHT: 69 IN | BODY MASS INDEX: 31.39 KG/M2 | SYSTOLIC BLOOD PRESSURE: 142 MMHG | DIASTOLIC BLOOD PRESSURE: 82 MMHG | WEIGHT: 211.9 LBS

## 2024-01-10 DIAGNOSIS — I25.10 ATHEROSCLEROSIS OF NATIVE CORONARY ARTERY OF NATIVE HEART WITHOUT ANGINA PECTORIS: ICD-10-CM

## 2024-01-10 DIAGNOSIS — E78.00 PURE HYPERCHOLESTEROLEMIA: ICD-10-CM

## 2024-01-10 DIAGNOSIS — I10 BENIGN ESSENTIAL HYPERTENSION: Primary | ICD-10-CM

## 2024-01-10 PROCEDURE — 1036F TOBACCO NON-USER: CPT | Performed by: NURSE PRACTITIONER

## 2024-01-10 PROCEDURE — 93005 ELECTROCARDIOGRAM TRACING: CPT | Performed by: NURSE PRACTITIONER

## 2024-01-10 PROCEDURE — 3079F DIAST BP 80-89 MM HG: CPT | Performed by: NURSE PRACTITIONER

## 2024-01-10 PROCEDURE — 93010 ELECTROCARDIOGRAM REPORT: CPT | Performed by: INTERNAL MEDICINE

## 2024-01-10 PROCEDURE — 3077F SYST BP >= 140 MM HG: CPT | Performed by: NURSE PRACTITIONER

## 2024-01-10 PROCEDURE — 3048F LDL-C <100 MG/DL: CPT | Performed by: NURSE PRACTITIONER

## 2024-01-10 PROCEDURE — 4010F ACE/ARB THERAPY RXD/TAKEN: CPT | Performed by: NURSE PRACTITIONER

## 2024-01-10 PROCEDURE — 3051F HG A1C>EQUAL 7.0%<8.0%: CPT | Performed by: NURSE PRACTITIONER

## 2024-01-10 PROCEDURE — 99214 OFFICE O/P EST MOD 30 MIN: CPT | Performed by: NURSE PRACTITIONER

## 2024-01-10 ASSESSMENT — ENCOUNTER SYMPTOMS
DEPRESSION: 0
LOSS OF SENSATION IN FEET: 0
OCCASIONAL FEELINGS OF UNSTEADINESS: 0
CARDIOVASCULAR NEGATIVE: 1

## 2024-01-10 NOTE — PROGRESS NOTES
Subjective   Buster Sims is a 48 y.o. male.    Chief Complaint:  none    HPI  Mr. Cosby is seen for follow-up.  He denies any recent ED or hospital visits.  He denies any significant changes to his health.  He has no particular cardiovascular concerns.  He is tolerating his medication well.  Unfortunately he continues to forget his p.m. dose of metoprolol on occasion.  He denies any symptoms referable to angina.  He continues to work 2 jobs and is rather active.  He does complain of some knee arthritis.  Otherwise he has no concerns.  The patient denies chest pain, shortness of breath, palpitations, lightheadedness, syncope, orthopnea, paroxysmal nocturnal dyspnea, lower extremity edema, or bleeding problems.      Review of Systems   Cardiovascular: Negative.    Musculoskeletal:  Positive for joint pain.   All other systems reviewed and are negative.      Objective   Vitals reviewed.   Constitutional:       Appearance: Healthy appearance. Not in distress.   Eyes:      Pupils: Pupils are equal, round, and reactive to light.   Neck:      Vascular: No JVR. JVD normal.   Pulmonary:      Effort: Pulmonary effort is normal.      Breath sounds: Normal breath sounds. No wheezing. No rhonchi. No rales.   Chest:      Chest wall: Not tender to palpatation.   Cardiovascular:      Normal rate. Regular rhythm. Normal S1. Normal S2.       Murmurs: There is no murmur.      No gallop.  No click. No rub.   Edema:     Peripheral edema absent.   Abdominal:      Tenderness: There is no abdominal tenderness.   Musculoskeletal: Normal range of motion.         General: No tenderness. Skin:     General: Skin is warm and dry.   Neurological:      General: No focal deficit present.      Mental Status: Alert and oriented to person, place and time.       Lab Review:   Lab Results   Component Value Date     01/08/2024    K 3.8 01/08/2024     01/08/2024    CO2 27 01/08/2024    BUN 9 01/08/2024    CREATININE 0.98 01/08/2024     GLUCOSE 104 (H) 01/08/2024    CALCIUM 9.6 01/08/2024     Lab Results   Component Value Date    WBC 9.8 01/08/2024    HGB 16.0 01/08/2024    HCT 46.2 01/08/2024    MCV 83 01/08/2024     01/08/2024     Lab Results   Component Value Date    CHOL 107 01/08/2024    CHOL 107 01/08/2024    TRIG 45 01/08/2024    HDL 44.2 01/08/2024    HDL 44.2 01/08/2024     ECG obtained and reviewed, shows normal sinus rhythm with minimal voltage for LVH, ventricular rate is 82 bpm      Assessment/Plan   Mr. Sims is a very pleasant 48 year old male with a past medical history significant for coronary artery disease by cath 4/2/2022 showing a moderate mid LAD lesion and normal LVEF after CA CS of 1540, hypertension, hyperlipidemia and diabetes mellitus.  He presents for follow-up and is rather stable from a cardiovascular standpoint.  Blood pressure is borderline elevated after a repeat manual check.  ECG is stable.  He does note missing an occasional p.m. dose of metoprolol.  We did talk about switching to metoprolol succinate.  He will check to see how much of his current metoprolol tartrate he has at home. He continues to be active and denies any symptoms referable to angina.  We reviewed his recent fasting lipid panel.  LDL is below goal at 54 mg/dL.  I would like him to continue his medication unchanged.  He would like to use metoprolol  tartrate until his current prescription is gone.  We talked about the importance of twice daily compliance.  I did suggest we switch to metoprolol succinate after that time.  He will follow-up in 3 months for a blood pressure check and ECG.  He knows to call with any interim concerns or questions.

## 2024-01-10 NOTE — PROGRESS NOTES
Physical Therapy    Discharge Summary    Name: Buster Sims  MRN: [id number]  : 1975  Date: 01/10/24    Discharge Summary: PT    Discharge Information: Date of discharge 1/10/24, Date of last visit 10/11/23, Date of evaluation 23, Number of attended visits 4, Referred by Donaldo, and Referred for back pain    Therapy Summary: Patient seen in PT for 4 visits for back pain.  Patient did not follow up in PT for reassessment on his last scheduled visit and did not reschedule.  Status unknown    Discharge Status: Status unknown.     Rehab Discharge Reason: Failed to schedule and/or keep follow-up appointment(s)

## 2024-01-11 LAB
ATRIAL RATE: 82 BPM
P AXIS: 22 DEGREES
P OFFSET: 187 MS
P ONSET: 135 MS
PR INTERVAL: 152 MS
Q ONSET: 211 MS
QRS COUNT: 14 BEATS
QRS DURATION: 94 MS
QT INTERVAL: 370 MS
QTC CALCULATION(BAZETT): 432 MS
QTC FREDERICIA: 410 MS
R AXIS: -36 DEGREES
T AXIS: 60 DEGREES
T OFFSET: 396 MS
VENTRICULAR RATE: 82 BPM

## 2024-01-20 ENCOUNTER — APPOINTMENT (OUTPATIENT)
Dept: PRIMARY CARE | Facility: CLINIC | Age: 49
End: 2024-01-20
Payer: COMMERCIAL

## 2024-02-03 ENCOUNTER — OFFICE VISIT (OUTPATIENT)
Dept: PRIMARY CARE | Facility: CLINIC | Age: 49
End: 2024-02-03
Payer: COMMERCIAL

## 2024-02-03 VITALS
SYSTOLIC BLOOD PRESSURE: 138 MMHG | DIASTOLIC BLOOD PRESSURE: 78 MMHG | WEIGHT: 213 LBS | HEIGHT: 69 IN | BODY MASS INDEX: 31.55 KG/M2

## 2024-02-03 DIAGNOSIS — Z12.5 PROSTATE CANCER SCREENING: Primary | ICD-10-CM

## 2024-02-03 DIAGNOSIS — E78.2 MIXED HYPERLIPIDEMIA: ICD-10-CM

## 2024-02-03 DIAGNOSIS — M54.9 BACK PAIN, UNSPECIFIED BACK LOCATION, UNSPECIFIED BACK PAIN LATERALITY, UNSPECIFIED CHRONICITY: ICD-10-CM

## 2024-02-03 DIAGNOSIS — E11.9 TYPE 2 DIABETES MELLITUS WITHOUT COMPLICATION, WITHOUT LONG-TERM CURRENT USE OF INSULIN (MULTI): ICD-10-CM

## 2024-02-03 DIAGNOSIS — E08.00 DIABETES MELLITUS DUE TO UNDERLYING CONDITION WITH HYPEROSMOLARITY WITHOUT COMA, WITHOUT LONG-TERM CURRENT USE OF INSULIN (MULTI): ICD-10-CM

## 2024-02-03 DIAGNOSIS — E11.65 POORLY CONTROLLED TYPE 2 DIABETES MELLITUS (MULTI): ICD-10-CM

## 2024-02-03 DIAGNOSIS — E03.9 HYPOTHYROIDISM, UNSPECIFIED TYPE: ICD-10-CM

## 2024-02-03 DIAGNOSIS — I10 BENIGN ESSENTIAL HYPERTENSION: ICD-10-CM

## 2024-02-03 PROCEDURE — 3051F HG A1C>EQUAL 7.0%<8.0%: CPT | Performed by: INTERNAL MEDICINE

## 2024-02-03 PROCEDURE — 3048F LDL-C <100 MG/DL: CPT | Performed by: INTERNAL MEDICINE

## 2024-02-03 PROCEDURE — 4010F ACE/ARB THERAPY RXD/TAKEN: CPT | Performed by: INTERNAL MEDICINE

## 2024-02-03 PROCEDURE — 1036F TOBACCO NON-USER: CPT | Performed by: INTERNAL MEDICINE

## 2024-02-03 PROCEDURE — 3075F SYST BP GE 130 - 139MM HG: CPT | Performed by: INTERNAL MEDICINE

## 2024-02-03 PROCEDURE — 3078F DIAST BP <80 MM HG: CPT | Performed by: INTERNAL MEDICINE

## 2024-02-03 PROCEDURE — 99213 OFFICE O/P EST LOW 20 MIN: CPT | Performed by: INTERNAL MEDICINE

## 2024-02-03 RX ORDER — METFORMIN HYDROCHLORIDE 1000 MG/1
1000 TABLET ORAL 2 TIMES DAILY
Qty: 180 TABLET | Refills: 0 | Status: SHIPPED | OUTPATIENT
Start: 2024-02-03

## 2024-02-03 RX ORDER — LEVOTHYROXINE SODIUM 50 UG/1
50 TABLET ORAL DAILY
Qty: 90 TABLET | Refills: 0 | Status: SHIPPED | OUTPATIENT
Start: 2024-02-03 | End: 2024-05-27

## 2024-02-03 RX ORDER — LISINOPRIL 40 MG/1
40 TABLET ORAL DAILY
Qty: 90 TABLET | Refills: 0 | Status: SHIPPED | OUTPATIENT
Start: 2024-02-03 | End: 2024-04-30

## 2024-02-03 NOTE — PROGRESS NOTES
"Subjective   Patient ID: Buster Sims is a 48 y.o. male who presents for multiple medical issues.    Buster Sims today came here for multiple medical issues.  Overall, he is okay.  Appetite and weight are okay.  No chest pain or shortness of breath.  Taking medications regularly with no side effects.  He came here for follow-up on various conditions.    I have personally reviewed the patient's Past Medical History, Medications, Allergies, Social History, and Family History in the EMR.    Review of Systems   All other systems reviewed and are negative.    Objective   /78   Ht 1.753 m (5' 9\")   Wt 96.6 kg (213 lb)   BMI 31.45 kg/m²     Physical Exam  Vitals reviewed.   Cardiovascular:      Heart sounds: Normal heart sounds, S1 normal and S2 normal. No murmur heard.     No friction rub.   Pulmonary:      Effort: Pulmonary effort is normal.      Breath sounds: Normal breath sounds and air entry.   Abdominal:      Palpations: There is no hepatomegaly, splenomegaly or mass.   Musculoskeletal:      Right lower leg: No edema.      Left lower leg: No edema.   Lymphadenopathy:      Lower Body: No right inguinal adenopathy. No left inguinal adenopathy.   Neurological:      Cranial Nerves: Cranial nerves 2-12 are intact.      Sensory: No sensory deficit.      Motor: Motor function is intact.      Deep Tendon Reflexes: Reflexes are normal and symmetric.     LAB WORK:  Laboratory testing discussed.    Assessment/Plan   Problem List Items Addressed This Visit             ICD-10-CM       Cardiac and Vasculature    Hyperlipidemia E78.5    Relevant Orders    CBC    Comprehensive Metabolic Panel    Hemoglobin A1C    Lipid Panel    Thyroid Stimulating Hormone    Benign essential hypertension I10    Relevant Medications    lisinopril 40 mg tablet    Other Relevant Orders    CBC    Comprehensive Metabolic Panel    Hemoglobin A1C    Lipid Panel    Thyroid Stimulating Hormone       Endocrine/Metabolic    Poorly " controlled type 2 diabetes mellitus (CMS/[facility]) E11.65    Relevant Orders    CBC    Comprehensive Metabolic Panel    Hemoglobin A1C    Lipid Panel    Thyroid Stimulating Hormone       Musculoskeletal and Injuries    Back pain M54.9     Other Visit Diagnoses         Codes    Prostate cancer screening    -  Primary Z12.5    Hypothyroidism, unspecified type     E03.9    Relevant Medications    levothyroxine (Synthroid, Levoxyl) 50 mcg tablet    Diabetes mellitus due to underlying condition with hyperosmolarity without coma, without long-term current use of insulin (CMS/[facility])     E08.00    Relevant Medications    metFORMIN (Glucophage) 1,000 mg tablet    Type 2 diabetes mellitus without complication, without long-term current use of insulin (CMS/[facility])     E11.9        1. Type 2 diabetes.  Hemoglobin A1c is very good.  He is going ______ good.  He went for eye checkup.  2. Hypertension, okay.  3. High cholesterol, okay.  4. Prostate health, okay.  5. Cardiac care, up to date.  6. Follow up in two to three months, but always happy to see him anytime sooner if necessary.    Scribe Attestation  By signing my name below, ISana Scribe attest that this documentation has been prepared under the direction and in the presence of Jemima Fulton MD.

## 2024-04-05 DIAGNOSIS — I10 BENIGN ESSENTIAL HYPERTENSION: Primary | ICD-10-CM

## 2024-04-12 DIAGNOSIS — I10 BENIGN ESSENTIAL HYPERTENSION: Primary | ICD-10-CM

## 2024-04-12 RX ORDER — METOPROLOL TARTRATE 25 MG/1
25 TABLET, FILM COATED ORAL 2 TIMES DAILY
Qty: 180 TABLET | Refills: 3 | Status: SHIPPED | OUTPATIENT
Start: 2024-04-12 | End: 2025-04-12

## 2024-04-14 DIAGNOSIS — E78.00 HYPERCHOLESTEREMIA: Primary | ICD-10-CM

## 2024-04-15 RX ORDER — EZETIMIBE 10 MG/1
10 TABLET ORAL NIGHTLY
Qty: 90 TABLET | Refills: 0 | Status: SHIPPED | OUTPATIENT
Start: 2024-04-15

## 2024-04-18 RX ORDER — METOPROLOL TARTRATE 25 MG/1
25 TABLET, FILM COATED ORAL 2 TIMES DAILY
Qty: 180 TABLET | Refills: 3 | Status: SHIPPED | OUTPATIENT
Start: 2024-04-18

## 2024-04-27 DIAGNOSIS — E11.9 TYPE 2 DIABETES MELLITUS WITHOUT COMPLICATION, WITHOUT LONG-TERM CURRENT USE OF INSULIN (MULTI): Primary | ICD-10-CM

## 2024-04-28 RX ORDER — EMPAGLIFLOZIN 25 MG/1
25 TABLET, FILM COATED ORAL DAILY
Qty: 90 TABLET | Refills: 3 | Status: SHIPPED | OUTPATIENT
Start: 2024-04-28

## 2024-06-01 ENCOUNTER — APPOINTMENT (OUTPATIENT)
Dept: PRIMARY CARE | Facility: CLINIC | Age: 49
End: 2024-06-01
Payer: COMMERCIAL

## 2024-06-20 ENCOUNTER — APPOINTMENT (OUTPATIENT)
Dept: ENDOCRINOLOGY | Facility: CLINIC | Age: 49
End: 2024-06-20
Payer: COMMERCIAL

## 2024-06-20 VITALS
WEIGHT: 209.44 LBS | DIASTOLIC BLOOD PRESSURE: 68 MMHG | SYSTOLIC BLOOD PRESSURE: 104 MMHG | BODY MASS INDEX: 30.93 KG/M2 | HEART RATE: 93 BPM

## 2024-06-20 DIAGNOSIS — E11.9 TYPE 2 DIABETES MELLITUS WITHOUT COMPLICATION, WITHOUT LONG-TERM CURRENT USE OF INSULIN (MULTI): ICD-10-CM

## 2024-06-20 PROCEDURE — 3051F HG A1C>EQUAL 7.0%<8.0%: CPT | Performed by: INTERNAL MEDICINE

## 2024-06-20 PROCEDURE — 3048F LDL-C <100 MG/DL: CPT | Performed by: INTERNAL MEDICINE

## 2024-06-20 PROCEDURE — 99214 OFFICE O/P EST MOD 30 MIN: CPT | Performed by: INTERNAL MEDICINE

## 2024-06-20 PROCEDURE — 3074F SYST BP LT 130 MM HG: CPT | Performed by: INTERNAL MEDICINE

## 2024-06-20 PROCEDURE — 4010F ACE/ARB THERAPY RXD/TAKEN: CPT | Performed by: INTERNAL MEDICINE

## 2024-06-20 PROCEDURE — 3078F DIAST BP <80 MM HG: CPT | Performed by: INTERNAL MEDICINE

## 2024-06-20 RX ORDER — SEMAGLUTIDE 2.68 MG/ML
2 INJECTION, SOLUTION SUBCUTANEOUS
Qty: 9 ML | Refills: 3 | Status: SHIPPED | OUTPATIENT
Start: 2024-06-23 | End: 2025-06-23

## 2024-06-20 NOTE — PROGRESS NOTES
History Of Present Illness  Buster Sims is a 48 y.o. male     Duration of type 2 diabetes mellitus:  19 years  Complications:  cardiovascular disease     Glimepiride 4 mg BID  Metformin 1000 mg BID  Ozempic 2 mg/week  Jardiance 25 mg/day     Patient is testing glucose 2 times daily  Records reviewed, on file    Last eye exam:  August 2023    Past Medical History  He has a past medical history of Encounter for screening for malignant neoplasm of prostate (04/18/2018), Infectious gastroenteritis and colitis, unspecified (04/16/2016), Other conditions influencing health status (12/16/2016), Pain in left shoulder (12/16/2016), Personal history of other (healed) physical injury and trauma (03/27/2015), Personal history of other diseases of the respiratory system (09/19/2015), and Type 2 diabetes mellitus with unspecified complications ([facility]) (05/18/2013).    Surgical History  He has a past surgical history that includes Other surgical history (05/18/2013).     Social History  He reports that he has never smoked. He has never used smokeless tobacco. He reports that he does not drink alcohol and does not use drugs.    Family History  Family History   Problem Relation Name Age of Onset    Diabetes Mother      Hyperlipidemia Mother      Hypertension Mother      Hypertension Father      Diabetes Father      Hyperlipidemia Father      Arthritis Paternal Grandmother         Medications  Current Outpatient Medications   Medication Instructions    aspirin 81 mg chewable tablet 1 tablet, oral, Daily    atorvastatin (LIPITOR) 20 mg, oral, Daily    cyclobenzaprine (FLEXERIL) 10 mg, oral, Nightly PRN    ezetimibe (ZETIA) 10 mg, oral, Nightly    glimepiride (Amaryl) 4 mg tablet 1 tablet, oral, 2 times daily    Jardiance 25 mg, oral, Daily    levothyroxine (SYNTHROID, LEVOXYL) 50 mcg, oral, Daily    lisinopril 40 mg, oral, Daily    metFORMIN (GLUCOPHAGE) 1,000 mg, oral, 2 times daily    metoprolol tartrate (LOPRESSOR) 25 mg,  oral, 2 times daily    metoprolol tartrate (LOPRESSOR) 25 mg, oral, 2 times daily    Ozempic 2 mg, subcutaneous, Once Weekly    sildenafil (VIAGRA) 100 mg, oral, As needed       Allergies  Oxycodone-acetaminophen    Review of Systems   Constitutional:  Negative for appetite change and fever.   HENT:  Negative for sore throat.         Denies dry mouth   Eyes:  Negative for visual disturbance.   Respiratory:  Negative for cough and shortness of breath.    Cardiovascular:  Negative for chest pain.   Gastrointestinal:  Positive for constipation. Negative for abdominal pain, diarrhea, nausea and vomiting.   Endocrine: Negative for polydipsia and polyuria.   Genitourinary:  Negative for frequency.   Musculoskeletal:  Positive for arthralgias, back pain, myalgias and neck pain.   Skin:  Negative for rash.   Neurological:  Negative for headaches.   Psychiatric/Behavioral:  The patient is nervous/anxious.          Last Recorded Vitals  Blood pressure 104/68, pulse 93, weight 95 kg (209 lb 7 oz).    Physical Exam  Constitutional:       General: He is not in acute distress.  HENT:      Head: Normocephalic.      Mouth/Throat:      Mouth: Mucous membranes are moist.   Eyes:      Extraocular Movements: Extraocular movements intact.   Neck:      Thyroid: No thyroid mass or thyromegaly.   Cardiovascular:      Pulses:           Radial pulses are 2+ on the right side and 2+ on the left side.   Musculoskeletal:      Right lower leg: No edema.      Left lower leg: No edema.   Lymphadenopathy:      Cervical: No cervical adenopathy.   Neurological:      Mental Status: He is alert.      Motor: No tremor.   Psychiatric:         Mood and Affect: Affect normal.          Relevant Results  Glucose (mg/dL)   Date Value   01/08/2024 104 (H)   08/10/2023 117 (H)   04/05/2023 138 (H)   12/19/2022 159 (H)     Hemoglobin A1C (%)   Date Value   01/08/2024 7.6 (H)   08/10/2023 7.7 (A)   04/05/2023 8.3 (A)   12/19/2022 9.0 (A)     Bicarbonate (mmol/L)    Date Value   01/08/2024 27   08/10/2023 25   04/05/2023 27   12/19/2022 26     Urea Nitrogen (mg/dL)   Date Value   01/08/2024 9   08/10/2023 9   04/05/2023 10   12/19/2022 11     Creatinine (mg/dL)   Date Value   01/08/2024 0.98   08/10/2023 0.94   04/05/2023 1.13   12/19/2022 1.06     Lab Results   Component Value Date    CHOL 107 01/08/2024    CHOL 107 01/08/2024    CHOL 123 08/10/2023     Lab Results   Component Value Date    HDL 44.2 01/08/2024    HDL 44.2 01/08/2024    HDL 46.1 08/10/2023     Lab Results   Component Value Date    LDLCALC 54 01/08/2024     Lab Results   Component Value Date    TRIG 45 01/08/2024    TRIG 57 08/10/2023    TRIG 88 04/05/2023     Lab Results   Component Value Date    TSH 1.14 01/08/2024       IMPRESSION  TYPE 2 DIABETES MELLITUS  No current TSH      RECOMMENDATIONS  Labs as ordered by Dr. Fulton    Continue current regimen    Follow up 6 months

## 2024-06-21 ASSESSMENT — ENCOUNTER SYMPTOMS
HEADACHES: 0
SHORTNESS OF BREATH: 0
FEVER: 0
BACK PAIN: 1
SORE THROAT: 0
NERVOUS/ANXIOUS: 1
NECK PAIN: 1
POLYDIPSIA: 0
APPETITE CHANGE: 0
NAUSEA: 0
FREQUENCY: 0
VOMITING: 0
COUGH: 0
MYALGIAS: 1
DIARRHEA: 0
CONSTIPATION: 1
ABDOMINAL PAIN: 0
ARTHRALGIAS: 1

## 2024-07-15 DIAGNOSIS — E78.00 HYPERCHOLESTEREMIA: ICD-10-CM

## 2024-07-15 RX ORDER — EZETIMIBE 10 MG/1
10 TABLET ORAL NIGHTLY
Qty: 90 TABLET | Refills: 0 | Status: SHIPPED | OUTPATIENT
Start: 2024-07-15

## 2024-07-21 DIAGNOSIS — E78.00 PURE HYPERCHOLESTEROLEMIA: Primary | ICD-10-CM

## 2024-07-22 RX ORDER — ATORVASTATIN CALCIUM 40 MG/1
TABLET, FILM COATED ORAL
Qty: 90 TABLET | Refills: 0 | Status: SHIPPED | OUTPATIENT
Start: 2024-07-22

## 2024-08-04 DIAGNOSIS — E11.9 TYPE 2 DIABETES MELLITUS WITHOUT COMPLICATION, WITHOUT LONG-TERM CURRENT USE OF INSULIN (MULTI): Primary | ICD-10-CM

## 2024-08-05 RX ORDER — GLIMEPIRIDE 4 MG/1
4 TABLET ORAL 2 TIMES DAILY
Qty: 180 TABLET | Refills: 3 | Status: SHIPPED | OUTPATIENT
Start: 2024-08-05

## 2024-08-08 DIAGNOSIS — E08.00 DIABETES MELLITUS DUE TO UNDERLYING CONDITION WITH HYPEROSMOLARITY WITHOUT COMA, WITHOUT LONG-TERM CURRENT USE OF INSULIN (MULTI): ICD-10-CM

## 2024-08-09 RX ORDER — METFORMIN HYDROCHLORIDE 1000 MG/1
1000 TABLET ORAL 2 TIMES DAILY
Qty: 180 TABLET | Refills: 3 | Status: SHIPPED | OUTPATIENT
Start: 2024-08-09

## 2024-10-14 DIAGNOSIS — E78.00 HYPERCHOLESTEREMIA: Primary | ICD-10-CM

## 2024-10-14 DIAGNOSIS — E78.00 HYPERCHOLESTEREMIA: ICD-10-CM

## 2024-10-14 RX ORDER — EZETIMIBE 10 MG/1
10 TABLET ORAL NIGHTLY
Qty: 90 TABLET | Refills: 3 | Status: SHIPPED | OUTPATIENT
Start: 2024-10-14

## 2024-10-14 RX ORDER — EZETIMIBE 10 MG/1
10 TABLET ORAL NIGHTLY
Qty: 90 TABLET | Refills: 3 | Status: SHIPPED | OUTPATIENT
Start: 2024-10-14 | End: 2025-10-14

## 2024-10-23 ENCOUNTER — OFFICE VISIT (OUTPATIENT)
Dept: CARDIOLOGY | Facility: HOSPITAL | Age: 49
End: 2024-10-23
Payer: COMMERCIAL

## 2024-10-23 ENCOUNTER — HOSPITAL ENCOUNTER (OUTPATIENT)
Dept: CARDIOLOGY | Facility: HOSPITAL | Age: 49
Discharge: HOME | End: 2024-10-23
Payer: COMMERCIAL

## 2024-10-23 VITALS
BODY MASS INDEX: 31.28 KG/M2 | WEIGHT: 211.2 LBS | HEIGHT: 69 IN | SYSTOLIC BLOOD PRESSURE: 130 MMHG | OXYGEN SATURATION: 97 % | HEART RATE: 79 BPM | DIASTOLIC BLOOD PRESSURE: 72 MMHG

## 2024-10-23 DIAGNOSIS — E78.00 LOW-DENSITY-LIPOID-TYPE (LDL) HYPERLIPOPROTEINEMIA: ICD-10-CM

## 2024-10-23 DIAGNOSIS — E78.00 HYPERCHOLESTEREMIA: ICD-10-CM

## 2024-10-23 DIAGNOSIS — I10 BENIGN ESSENTIAL HYPERTENSION: Primary | ICD-10-CM

## 2024-10-23 DIAGNOSIS — E78.00 PURE HYPERCHOLESTEROLEMIA: ICD-10-CM

## 2024-10-23 DIAGNOSIS — I25.10 ATHEROSCLEROSIS OF NATIVE CORONARY ARTERY OF NATIVE HEART WITHOUT ANGINA PECTORIS: ICD-10-CM

## 2024-10-23 PROCEDURE — 93010 ELECTROCARDIOGRAM REPORT: CPT | Performed by: INTERNAL MEDICINE

## 2024-10-23 PROCEDURE — 3048F LDL-C <100 MG/DL: CPT | Performed by: NURSE PRACTITIONER

## 2024-10-23 PROCEDURE — 99214 OFFICE O/P EST MOD 30 MIN: CPT | Performed by: NURSE PRACTITIONER

## 2024-10-23 PROCEDURE — 3008F BODY MASS INDEX DOCD: CPT | Performed by: NURSE PRACTITIONER

## 2024-10-23 PROCEDURE — 3078F DIAST BP <80 MM HG: CPT | Performed by: NURSE PRACTITIONER

## 2024-10-23 PROCEDURE — 4010F ACE/ARB THERAPY RXD/TAKEN: CPT | Performed by: NURSE PRACTITIONER

## 2024-10-23 PROCEDURE — 3051F HG A1C>EQUAL 7.0%<8.0%: CPT | Performed by: NURSE PRACTITIONER

## 2024-10-23 PROCEDURE — 3075F SYST BP GE 130 - 139MM HG: CPT | Performed by: NURSE PRACTITIONER

## 2024-10-23 RX ORDER — ATORVASTATIN CALCIUM 40 MG/1
40 TABLET, FILM COATED ORAL DAILY
Qty: 90 TABLET | Refills: 2 | Status: SHIPPED | OUTPATIENT
Start: 2024-10-23 | End: 2025-10-23

## 2024-10-23 RX ORDER — LISINOPRIL 40 MG/1
40 TABLET ORAL DAILY
Qty: 90 TABLET | Refills: 2 | Status: SHIPPED | OUTPATIENT
Start: 2024-10-23

## 2024-10-23 ASSESSMENT — ENCOUNTER SYMPTOMS
RESPIRATORY NEGATIVE: 1
CARDIOVASCULAR NEGATIVE: 1

## 2024-10-23 NOTE — PROGRESS NOTES
Subjective   Buster Sims is a 49 y.o. male.    Chief Complaint:  No chief complaint on file.    HPI  Mr. Schwab is seen for 6-month follow-up visit.  He is seen in collaboration with Dr. Jo.  He has no particular cardiovascular concerns today.  He is compliant with all medication and denies any intolerances.  He was started on Ozempic over the summer for elevated glucose.  He is trying to diabetes under better control.  He is trying to maintain a prudent diet.  He is rather active working 2 jobs.  He denies any symptoms referable to angina.  He denies any recent hospitalizations or ED visits.    Review of Systems   Cardiovascular: Negative.    Respiratory: Negative.     All other systems reviewed and are negative.      Objective   Vitals reviewed.   Constitutional:       Appearance: Healthy appearance. Not in distress.   Neck:      Vascular: JVD normal.   Pulmonary:      Effort: Pulmonary effort is normal.      Breath sounds: Normal breath sounds. No wheezing. No rhonchi. No rales.   Chest:      Chest wall: Not tender to palpatation.   Cardiovascular:      Normal rate. Regular rhythm. Normal S1. Normal S2.       Murmurs: There is no murmur.      No gallop.  No click. No rub.   Edema:     Peripheral edema absent.   Abdominal:      Tenderness: There is no abdominal tenderness.   Musculoskeletal: Normal range of motion.         General: No tenderness. Skin:     General: Skin is warm and dry.   Neurological:      General: No focal deficit present.      Mental Status: Alert and oriented to person, place and time.         Lab Review:   Lab Results   Component Value Date     01/08/2024    K 3.8 01/08/2024     01/08/2024    CO2 27 01/08/2024    BUN 9 01/08/2024    CREATININE 0.98 01/08/2024    GLUCOSE 104 (H) 01/08/2024    CALCIUM 9.6 01/08/2024     Lab Results   Component Value Date    WBC 9.8 01/08/2024    HGB 16.0 01/08/2024    HCT 46.2 01/08/2024    MCV 83 01/08/2024     01/08/2024      Lab Results   Component Value Date    CHOL 107 01/08/2024    CHOL 107 01/08/2024    TRIG 45 01/08/2024    HDL 44.2 01/08/2024    HDL 44.2 01/08/2024     ECG obtained and reviewed, shows normal sinus rhythm with left axis deviation and ventricular rate of 79 bpm      Assessment/Plan   The encounter diagnosis was Benign essential hypertension.  Mr. Sims is a very pleasant 49 year old male with a past medical history significant for coronary artery disease by cath 4/2/2022 showing a moderate mid LAD lesion and normal LVEF and CACS of 1540, hypertension, hyperlipidemia and type II diabetes mellitus.  He presents for follow-up and maintenance stable from a cardiovascular standpoint.  Blood pressure stable.  His reading was elevated initially however he said he was in a rush to get here.   ECG is stable.  Most recent FLP is at goal.  I would like him to continue prudent diet and exercise.  We did talk about monitoring his blood pressure at home once or twice per week..  He was encouraged to continue a prudent diet and aerobic exercise.  Will return in another 6 months.  He does know to call with any concerns or questions in the interim.

## 2024-10-24 LAB
ATRIAL RATE: 79 BPM
P AXIS: 42 DEGREES
P OFFSET: 194 MS
P ONSET: 138 MS
PR INTERVAL: 148 MS
Q ONSET: 212 MS
QRS COUNT: 13 BEATS
QRS DURATION: 92 MS
QT INTERVAL: 356 MS
QTC CALCULATION(BAZETT): 408 MS
QTC FREDERICIA: 390 MS
R AXIS: -40 DEGREES
T AXIS: -26 DEGREES
T OFFSET: 390 MS
VENTRICULAR RATE: 79 BPM

## 2024-10-24 PROCEDURE — 93005 ELECTROCARDIOGRAM TRACING: CPT

## 2024-12-18 ENCOUNTER — LAB (OUTPATIENT)
Dept: LAB | Facility: LAB | Age: 49
End: 2024-12-18
Payer: COMMERCIAL

## 2024-12-18 DIAGNOSIS — E11.65 POORLY CONTROLLED TYPE 2 DIABETES MELLITUS (MULTI): ICD-10-CM

## 2024-12-18 DIAGNOSIS — I10 BENIGN ESSENTIAL HYPERTENSION: ICD-10-CM

## 2024-12-18 DIAGNOSIS — E78.2 MIXED HYPERLIPIDEMIA: ICD-10-CM

## 2024-12-18 LAB
ALBUMIN SERPL BCP-MCNC: 4.3 G/DL (ref 3.4–5)
ALP SERPL-CCNC: 61 U/L (ref 33–120)
ALT SERPL W P-5'-P-CCNC: 33 U/L (ref 10–52)
ANION GAP SERPL CALCULATED.3IONS-SCNC: 13 MMOL/L (ref 10–20)
AST SERPL W P-5'-P-CCNC: 27 U/L (ref 9–39)
BILIRUB SERPL-MCNC: 0.8 MG/DL (ref 0–1.2)
BUN SERPL-MCNC: 10 MG/DL (ref 6–23)
CALCIUM SERPL-MCNC: 9.3 MG/DL (ref 8.6–10.3)
CHLORIDE SERPL-SCNC: 104 MMOL/L (ref 98–107)
CHOLEST SERPL-MCNC: 117 MG/DL (ref 0–199)
CHOLEST/HDLC SERPL: 2.6 {RATIO}
CO2 SERPL-SCNC: 26 MMOL/L (ref 21–32)
CREAT SERPL-MCNC: 0.96 MG/DL (ref 0.5–1.3)
EGFRCR SERPLBLD CKD-EPI 2021: >90 ML/MIN/1.73M*2
ERYTHROCYTE [DISTWIDTH] IN BLOOD BY AUTOMATED COUNT: 13.9 % (ref 11.5–14.5)
GLUCOSE SERPL-MCNC: 107 MG/DL (ref 74–99)
HCT VFR BLD AUTO: 47.1 % (ref 41–52)
HDLC SERPL-MCNC: 44.9 MG/DL
HGB BLD-MCNC: 16.5 G/DL (ref 13.5–17.5)
LDLC SERPL CALC-MCNC: 62 MG/DL
MCH RBC QN AUTO: 28.6 PG (ref 26–34)
MCHC RBC AUTO-ENTMCNC: 35 G/DL (ref 32–36)
MCV RBC AUTO: 82 FL (ref 80–100)
NON HDL CHOLESTEROL: 72 MG/DL (ref 0–149)
NRBC BLD-RTO: 0 /100 WBCS (ref 0–0)
PLATELET # BLD AUTO: 241 X10*3/UL (ref 150–450)
POTASSIUM SERPL-SCNC: 4 MMOL/L (ref 3.5–5.3)
PROT SERPL-MCNC: 7.1 G/DL (ref 6.4–8.2)
RBC # BLD AUTO: 5.76 X10*6/UL (ref 4.5–5.9)
SODIUM SERPL-SCNC: 139 MMOL/L (ref 136–145)
TRIGL SERPL-MCNC: 51 MG/DL (ref 0–149)
TSH SERPL-ACNC: 1.96 MIU/L (ref 0.44–3.98)
VLDL: 10 MG/DL (ref 0–40)
WBC # BLD AUTO: 11.1 X10*3/UL (ref 4.4–11.3)

## 2024-12-18 PROCEDURE — 83036 HEMOGLOBIN GLYCOSYLATED A1C: CPT

## 2024-12-18 PROCEDURE — 36415 COLL VENOUS BLD VENIPUNCTURE: CPT

## 2024-12-18 PROCEDURE — 84443 ASSAY THYROID STIM HORMONE: CPT

## 2024-12-18 PROCEDURE — 85027 COMPLETE CBC AUTOMATED: CPT

## 2024-12-18 PROCEDURE — 80061 LIPID PANEL: CPT

## 2024-12-18 PROCEDURE — 80053 COMPREHEN METABOLIC PANEL: CPT

## 2024-12-19 ENCOUNTER — TELEPHONE (OUTPATIENT)
Dept: CARDIOLOGY | Facility: CLINIC | Age: 49
End: 2024-12-19

## 2024-12-19 ENCOUNTER — APPOINTMENT (OUTPATIENT)
Dept: ENDOCRINOLOGY | Facility: CLINIC | Age: 49
End: 2024-12-19
Payer: COMMERCIAL

## 2024-12-19 VITALS
SYSTOLIC BLOOD PRESSURE: 121 MMHG | BODY MASS INDEX: 31.74 KG/M2 | WEIGHT: 214.95 LBS | DIASTOLIC BLOOD PRESSURE: 71 MMHG | HEART RATE: 97 BPM

## 2024-12-19 DIAGNOSIS — E11.9 TYPE 2 DIABETES MELLITUS WITHOUT COMPLICATION, WITHOUT LONG-TERM CURRENT USE OF INSULIN (MULTI): Primary | ICD-10-CM

## 2024-12-19 LAB
EST. AVERAGE GLUCOSE BLD GHB EST-MCNC: 194 MG/DL
HBA1C MFR BLD: 8.4 %

## 2024-12-19 PROCEDURE — 3052F HG A1C>EQUAL 8.0%<EQUAL 9.0%: CPT | Performed by: INTERNAL MEDICINE

## 2024-12-19 PROCEDURE — 99214 OFFICE O/P EST MOD 30 MIN: CPT | Performed by: INTERNAL MEDICINE

## 2024-12-19 PROCEDURE — 3048F LDL-C <100 MG/DL: CPT | Performed by: INTERNAL MEDICINE

## 2024-12-19 PROCEDURE — 1036F TOBACCO NON-USER: CPT | Performed by: INTERNAL MEDICINE

## 2024-12-19 PROCEDURE — 4010F ACE/ARB THERAPY RXD/TAKEN: CPT | Performed by: INTERNAL MEDICINE

## 2024-12-19 PROCEDURE — 3074F SYST BP LT 130 MM HG: CPT | Performed by: INTERNAL MEDICINE

## 2024-12-19 PROCEDURE — 3078F DIAST BP <80 MM HG: CPT | Performed by: INTERNAL MEDICINE

## 2024-12-19 RX ORDER — BLOOD-GLUCOSE SENSOR
EACH MISCELLANEOUS
Qty: 2 EACH | Refills: 11 | Status: SHIPPED | OUTPATIENT
Start: 2024-12-19

## 2024-12-19 ASSESSMENT — ENCOUNTER SYMPTOMS
FEVER: 0
SORE THROAT: 0
DIARRHEA: 0
VOMITING: 0
SHORTNESS OF BREATH: 0
BACK PAIN: 1
ABDOMINAL PAIN: 0
ARTHRALGIAS: 1
MYALGIAS: 1
HEADACHES: 0
NERVOUS/ANXIOUS: 0
CONSTIPATION: 0
NAUSEA: 0
FREQUENCY: 0
APPETITE CHANGE: 0
COUGH: 0
POLYDIPSIA: 0

## 2024-12-19 NOTE — LETTER
December 19, 2024     Jemima Fulton MD  [address] [place] [address]   [place] [facility], [address]  [place] [address]    Patient: Buster Sims   YOB: 1975   Date of Visit: 12/19/2024       Dear Dr. Jemima Fulton MD:    Thank you for referring Buster Sims to me for evaluation. Below are my notes for this consultation.  If you have questions, please do not hesitate to call me. I look forward to following your patient along with you.       Sincerely,     Calin Peralta MD      CC: No Recipients  ______________________________________________________________________________________    History Of Present Illness  Buster Sims is a 49 y.o. male     Duration of type 2 diabetes mellitus:  19 years  Complications:  cardiovascular disease     Glimepiride 4 mg BID  Metformin 1000 mg BID  Ozempic 2 mg/week  Jardiance 25 mg/day     Patient is testing glucose 2 times daily  Records reviewed, on file     Last eye exam:  August 2023    Past Medical History  He has a past medical history of Encounter for screening for malignant neoplasm of prostate (04/18/2018), Infectious gastroenteritis and colitis, unspecified (04/16/2016), Other conditions influencing health status (12/16/2016), Pain in left shoulder (12/16/2016), Personal history of other (healed) physical injury and trauma (03/27/2015), Personal history of other diseases of the respiratory system (09/19/2015), and Type 2 diabetes mellitus with unspecified complications (05/18/2013).    Surgical History  He has a past surgical history that includes Other surgical history (05/18/2013).     Social History  He reports that he has never smoked. He has never used smokeless tobacco. He reports that he does not drink alcohol and does not use drugs.    Family History  Family History   Problem Relation Name Age of Onset   • Diabetes Mother     • Hyperlipidemia Mother     • Hypertension Mother     • Hypertension Father     • Diabetes Father     •  Hyperlipidemia Father     • Arthritis Paternal Grandmother         Medications  Current Outpatient Medications   Medication Instructions   • aspirin 81 mg chewable tablet 1 tablet, Daily   • atorvastatin (LIPITOR) 40 mg, oral, Daily   • empagliflozin (JARDIANCE) 25 mg, oral, Daily   • ezetimibe (ZETIA) 10 mg, oral, Nightly   • glimepiride (AMARYL) 4 mg, oral, 2 times daily   • levothyroxine (SYNTHROID, LEVOXYL) 50 mcg, oral, Daily   • lisinopril 40 mg, oral, Daily   • metFORMIN (GLUCOPHAGE) 1,000 mg, oral, 2 times daily   • metoprolol tartrate (LOPRESSOR) 25 mg, oral, 2 times daily   • Ozempic 2 mg, subcutaneous, Once Weekly   • sildenafil (VIAGRA) 100 mg, As needed       Allergies  Oxycodone-acetaminophen    Review of Systems   Constitutional:  Negative for appetite change and fever.   HENT:  Negative for sore throat.         Denies dry mouth   Eyes:  Negative for visual disturbance.   Respiratory:  Negative for cough and shortness of breath.    Cardiovascular:  Negative for chest pain.   Gastrointestinal:  Negative for abdominal pain, constipation, diarrhea, nausea and vomiting.   Endocrine: Negative for polydipsia and polyuria.   Genitourinary:  Negative for frequency.        Erectile dysfunction   Musculoskeletal:  Positive for arthralgias, back pain and myalgias.   Skin:  Negative for rash.   Neurological:  Negative for headaches.   Psychiatric/Behavioral:  The patient is not nervous/anxious.          Last Recorded Vitals  Blood pressure 121/71, pulse 97, weight 97.5 kg (214 lb 15.2 oz).    Physical Exam  Constitutional:       General: He is not in acute distress.  HENT:      Head: Normocephalic.      Mouth/Throat:      Mouth: Mucous membranes are moist.   Eyes:      Extraocular Movements: Extraocular movements intact.   Neck:      Thyroid: No thyroid mass or thyromegaly.   Cardiovascular:      Pulses:           Radial pulses are 2+ on the right side and 2+ on the left side.   Musculoskeletal:      Right lower  leg: No edema.      Left lower leg: No edema.   Feet:      Comments: Flat feet.  Mild callus formation left 1st MTP  Lymphadenopathy:      Cervical: No cervical adenopathy.   Neurological:      Mental Status: He is alert.      Motor: No tremor.   Psychiatric:         Mood and Affect: Affect normal.          Relevant Results  Glucose (mg/dL)   Date Value   12/18/2024 107 (H)   01/08/2024 104 (H)   08/10/2023 117 (H)   04/05/2023 138 (H)   12/19/2022 159 (H)     Hemoglobin A1C (%)   Date Value   12/18/2024 8.4 (H)   01/08/2024 7.6 (H)   08/10/2023 7.7 (A)   04/05/2023 8.3 (A)   12/19/2022 9.0 (A)     Bicarbonate (mmol/L)   Date Value   12/18/2024 26   01/08/2024 27   08/10/2023 25   04/05/2023 27   12/19/2022 26     Urea Nitrogen (mg/dL)   Date Value   12/18/2024 10   01/08/2024 9   08/10/2023 9   04/05/2023 10   12/19/2022 11     Creatinine (mg/dL)   Date Value   12/18/2024 0.96   01/08/2024 0.98   08/10/2023 0.94   04/05/2023 1.13   12/19/2022 1.06     Lab Results   Component Value Date    CHOL 117 12/18/2024    CHOL 107 01/08/2024    CHOL 107 01/08/2024     Lab Results   Component Value Date    HDL 44.9 12/18/2024    HDL 44.2 01/08/2024    HDL 44.2 01/08/2024     Lab Results   Component Value Date    LDLCALC 62 12/18/2024    LDLCALC 54 01/08/2024     Lab Results   Component Value Date    TRIG 51 12/18/2024    TRIG 45 01/08/2024    TRIG 57 08/10/2023     Lab Results   Component Value Date    TSH 1.96 12/18/2024       IMPRESSION  TYPE 2 DIABETES MELLITUS  Rising A1c  Some dietary indiscretion  Physical work, two jobs  Maximal doses of 4 anti-hyperglycemic agents      RECOMMENDATIONS  Decrease sugars and starches   Recreational activity, aim for 30 min per day  Do not miss medications  Take metformin and glimepiride twice daily before meals, not necessarily every 12 hours    Trial of FreeStyle Michelle 3 Plus  Review [employer] 3 julina at all appointments    Follow up 6 months  A1c before next appointment

## 2024-12-19 NOTE — PATIENT INSTRUCTIONS
RECOMMENDATIONS  Decrease sugars and starches   Recreational activity, aim for 30 min per day  Do not miss medications  Take metformin and glimepiride twice daily before meals, not necessarily every 12 hours    Trial of FreeStyle Michelle 3 Plus  Review Michelle 3 julian at all appointments    Follow up 6 months  A1c before next appointment

## 2024-12-19 NOTE — PROGRESS NOTES
History Of Present Illness  Buster Sims is a 49 y.o. male     Duration of type 2 diabetes mellitus:  19 years  Complications:  cardiovascular disease     Glimepiride 4 mg BID  Metformin 1000 mg BID  Ozempic 2 mg/week  Jardiance 25 mg/day     Patient is testing glucose 2 times daily  Records reviewed, on file     Last eye exam:  August 2023    Past Medical History  He has a past medical history of Encounter for screening for malignant neoplasm of prostate (04/18/2018), Infectious gastroenteritis and colitis, unspecified (04/16/2016), Other conditions influencing health status (12/16/2016), Pain in left shoulder (12/16/2016), Personal history of other (healed) physical injury and trauma (03/27/2015), Personal history of other diseases of the respiratory system (09/19/2015), and Type 2 diabetes mellitus with unspecified complications (05/18/2013).    Surgical History  He has a past surgical history that includes Other surgical history (05/18/2013).     Social History  He reports that he has never smoked. He has never used smokeless tobacco. He reports that he does not drink alcohol and does not use drugs.    Family History  Family History   Problem Relation Name Age of Onset    Diabetes Mother      Hyperlipidemia Mother      Hypertension Mother      Hypertension Father      Diabetes Father      Hyperlipidemia Father      Arthritis Paternal Grandmother         Medications  Current Outpatient Medications   Medication Instructions    aspirin 81 mg chewable tablet 1 tablet, Daily    atorvastatin (LIPITOR) 40 mg, oral, Daily    empagliflozin (JARDIANCE) 25 mg, oral, Daily    ezetimibe (ZETIA) 10 mg, oral, Nightly    glimepiride (AMARYL) 4 mg, oral, 2 times daily    levothyroxine (SYNTHROID, LEVOXYL) 50 mcg, oral, Daily    lisinopril 40 mg, oral, Daily    metFORMIN (GLUCOPHAGE) 1,000 mg, oral, 2 times daily    metoprolol tartrate (LOPRESSOR) 25 mg, oral, 2 times daily    Ozempic 2 mg, subcutaneous, Once Weekly     sildenafil (VIAGRA) 100 mg, As needed       Allergies  Oxycodone-acetaminophen    Review of Systems   Constitutional:  Negative for appetite change and fever.   HENT:  Negative for sore throat.         Denies dry mouth   Eyes:  Negative for visual disturbance.   Respiratory:  Negative for cough and shortness of breath.    Cardiovascular:  Negative for chest pain.   Gastrointestinal:  Negative for abdominal pain, constipation, diarrhea, nausea and vomiting.   Endocrine: Negative for polydipsia and polyuria.   Genitourinary:  Negative for frequency.        Erectile dysfunction   Musculoskeletal:  Positive for arthralgias, back pain and myalgias.   Skin:  Negative for rash.   Neurological:  Negative for headaches.   Psychiatric/Behavioral:  The patient is not nervous/anxious.          Last Recorded Vitals  Blood pressure 121/71, pulse 97, weight 97.5 kg (214 lb 15.2 oz).    Physical Exam  Constitutional:       General: He is not in acute distress.  HENT:      Head: Normocephalic.      Mouth/Throat:      Mouth: Mucous membranes are moist.   Eyes:      Extraocular Movements: Extraocular movements intact.   Neck:      Thyroid: No thyroid mass or thyromegaly.   Cardiovascular:      Pulses:           Radial pulses are 2+ on the right side and 2+ on the left side.   Musculoskeletal:      Right lower leg: No edema.      Left lower leg: No edema.   Feet:      Comments: Flat feet.  Mild callus formation left 1st MTP  Lymphadenopathy:      Cervical: No cervical adenopathy.   Neurological:      Mental Status: He is alert.      Motor: No tremor.   Psychiatric:         Mood and Affect: Affect normal.          Relevant Results  Glucose (mg/dL)   Date Value   12/18/2024 107 (H)   01/08/2024 104 (H)   08/10/2023 117 (H)   04/05/2023 138 (H)   12/19/2022 159 (H)     Hemoglobin A1C (%)   Date Value   12/18/2024 8.4 (H)   01/08/2024 7.6 (H)   08/10/2023 7.7 (A)   04/05/2023 8.3 (A)   12/19/2022 9.0 (A)     Bicarbonate (mmol/L)   Date  Value   12/18/2024 26   01/08/2024 27   08/10/2023 25   04/05/2023 27   12/19/2022 26     Urea Nitrogen (mg/dL)   Date Value   12/18/2024 10   01/08/2024 9   08/10/2023 9   04/05/2023 10   12/19/2022 11     Creatinine (mg/dL)   Date Value   12/18/2024 0.96   01/08/2024 0.98   08/10/2023 0.94   04/05/2023 1.13   12/19/2022 1.06     Lab Results   Component Value Date    CHOL 117 12/18/2024    CHOL 107 01/08/2024    CHOL 107 01/08/2024     Lab Results   Component Value Date    HDL 44.9 12/18/2024    HDL 44.2 01/08/2024    HDL 44.2 01/08/2024     Lab Results   Component Value Date    LDLCALC 62 12/18/2024    LDLCALC 54 01/08/2024     Lab Results   Component Value Date    TRIG 51 12/18/2024    TRIG 45 01/08/2024    TRIG 57 08/10/2023     Lab Results   Component Value Date    TSH 1.96 12/18/2024       IMPRESSION  TYPE 2 DIABETES MELLITUS  Rising A1c  Some dietary indiscretion  Physical work, two jobs  Maximal doses of 4 anti-hyperglycemic agents      RECOMMENDATIONS  Decrease sugars and starches   Recreational activity, aim for 30 min per day  Do not miss medications  Take metformin and glimepiride twice daily before meals, not necessarily every 12 hours    Trial of FreeStyle Michelle 3 Plus  Review [employer] 3 julian at all appointments    Follow up 6 months  A1c before next appointment

## 2024-12-23 ENCOUNTER — TELEPHONE (OUTPATIENT)
Dept: ENDOCRINOLOGY | Facility: CLINIC | Age: 49
End: 2024-12-23
Payer: COMMERCIAL

## 2024-12-23 DIAGNOSIS — E11.9 TYPE 2 DIABETES MELLITUS WITHOUT COMPLICATION, WITHOUT LONG-TERM CURRENT USE OF INSULIN (MULTI): Primary | ICD-10-CM

## 2024-12-23 RX ORDER — FLASH GLUCOSE SENSOR
KIT MISCELLANEOUS
Qty: 6 EACH | Refills: 3 | Status: SHIPPED | OUTPATIENT
Start: 2024-12-23

## 2024-12-23 NOTE — TELEPHONE ENCOUNTER
[facility] in [place] called on pts behalf and stated that the Michelle 3 plus sensors are on back order and that while pt was there he shared he would be open to starting the Michelle 2 if new prescription could be sent

## 2025-01-18 ENCOUNTER — APPOINTMENT (OUTPATIENT)
Dept: PRIMARY CARE | Facility: CLINIC | Age: 50
End: 2025-01-18
Payer: COMMERCIAL

## 2025-01-18 VITALS
BODY MASS INDEX: 31.25 KG/M2 | HEIGHT: 69 IN | SYSTOLIC BLOOD PRESSURE: 130 MMHG | DIASTOLIC BLOOD PRESSURE: 82 MMHG | WEIGHT: 211 LBS

## 2025-01-18 DIAGNOSIS — Z12.5 PROSTATE CANCER SCREENING: Primary | ICD-10-CM

## 2025-01-18 DIAGNOSIS — M19.90 ARTHRITIS: ICD-10-CM

## 2025-01-18 DIAGNOSIS — R37 SEXUAL DYSFUNCTION: ICD-10-CM

## 2025-01-18 DIAGNOSIS — E08.00 DIABETES MELLITUS DUE TO UNDERLYING CONDITION WITH HYPEROSMOLARITY WITHOUT COMA, WITHOUT LONG-TERM CURRENT USE OF INSULIN: ICD-10-CM

## 2025-01-18 DIAGNOSIS — I10 BENIGN ESSENTIAL HYPERTENSION: ICD-10-CM

## 2025-01-18 DIAGNOSIS — E78.2 MIXED HYPERLIPIDEMIA: ICD-10-CM

## 2025-01-18 DIAGNOSIS — E11.65 POORLY CONTROLLED TYPE 2 DIABETES MELLITUS (MULTI): ICD-10-CM

## 2025-01-18 PROCEDURE — 3079F DIAST BP 80-89 MM HG: CPT | Performed by: INTERNAL MEDICINE

## 2025-01-18 PROCEDURE — 3008F BODY MASS INDEX DOCD: CPT | Performed by: INTERNAL MEDICINE

## 2025-01-18 PROCEDURE — 3075F SYST BP GE 130 - 139MM HG: CPT | Performed by: INTERNAL MEDICINE

## 2025-01-18 PROCEDURE — 4010F ACE/ARB THERAPY RXD/TAKEN: CPT | Performed by: INTERNAL MEDICINE

## 2025-01-18 PROCEDURE — 99214 OFFICE O/P EST MOD 30 MIN: CPT | Performed by: INTERNAL MEDICINE

## 2025-01-18 RX ORDER — SILDENAFIL 100 MG/1
100 TABLET, FILM COATED ORAL AS NEEDED
Qty: 30 TABLET | Refills: 1 | Status: SHIPPED | OUTPATIENT
Start: 2025-01-18

## 2025-01-18 ASSESSMENT — ENCOUNTER SYMPTOMS
LOSS OF SENSATION IN FEET: 0
OCCASIONAL FEELINGS OF UNSTEADINESS: 0
DEPRESSION: 0

## 2025-01-18 NOTE — PROGRESS NOTES
"Subjective   Patient ID: Buster Sims is a 49 y.o. male who presents for Follow-up.    This gentleman today came here for multiple medical issues.  He told me because of holidays his blood sugar is high, but he wants to make it right.  He wants Viagra prescription.  Appetite and weight are okay.  No problem.  He came here for follow-up on other conditions.    I have personally reviewed the patient's Past Medical History, Medications, Allergies, Social History, and Family History in the EMR.    Review of Systems   All other systems reviewed and are negative.    Objective   /82   Ht 1.753 m (5' 9\")   Wt 95.7 kg (211 lb)   BMI 31.16 kg/m²     Physical Exam  Vitals reviewed.   Cardiovascular:      Heart sounds: Normal heart sounds, S1 normal and S2 normal. No murmur heard.     No friction rub.   Pulmonary:      Effort: Pulmonary effort is normal.      Breath sounds: Normal breath sounds and air entry.   Abdominal:      Palpations: There is no hepatomegaly, splenomegaly or mass.   Musculoskeletal:      Right lower leg: No edema.      Left lower leg: No edema.   Lymphadenopathy:      Lower Body: No right inguinal adenopathy. No left inguinal adenopathy.   Neurological:      Cranial Nerves: Cranial nerves 2-12 are intact.      Sensory: No sensory deficit.      Motor: Motor function is intact.      Deep Tendon Reflexes: Reflexes are normal and symmetric.     LAB WORK:  Laboratory testing discussed.    Assessment/Plan   Problem List Items Addressed This Visit             ICD-10-CM       Cardiac and Vasculature    Hyperlipidemia E78.5    Relevant Orders    Comprehensive Metabolic Panel    Lipid Panel    Benign essential hypertension I10    Relevant Orders    Thyroid Stimulating Hormone       Endocrine/Metabolic    Poorly controlled type 2 diabetes mellitus (Multi) E11.65    Relevant Orders    Hemoglobin A1C       Genitourinary and Reproductive    Sexual dysfunction R37    Relevant Medications    sildenafil " (Viagra) 100 mg tablet     Other Visit Diagnoses         Codes    Prostate cancer screening    -  Primary Z12.5    Arthritis     M19.90        1. Type 2 diabetes.  Four agents.  Might need insulin.  We are going to tighten up his sugar control better and see if it works.  2. Hypertension, excellent.  3. Cholesterol, excellent.  4. Sexual dysfunction.  Viagra helps.  5. I urged him to go for eye checkup.  6. Prostate health, okay.  7. Arthritis, bone and joint pain, we discussed.  I think underwater exercises, seven days a week right way.  Otherwise, intermittent course of anti-inflammatory for symptom relief.  7. I shall see him back in March.  Happy to see him anytime sooner if necessary.    Scribe Attestation  By signing my name below, ISana Scribe attest that this documentation has been prepared under the direction and in the presence of Jemima Fulton MD.     All medical record entries made by the tyroneibjustin were personally dictated by me I have reviewed the chart and agree the record accurately reflects my personal performance of his history physical examination and management

## 2025-01-19 PROBLEM — E08.00 DIABETES MELLITUS DUE TO UNDERLYING CONDITION WITH HYPEROSMOLARITY WITHOUT COMA, WITHOUT LONG-TERM CURRENT USE OF INSULIN: Status: ACTIVE | Noted: 2025-01-19

## 2025-02-12 ENCOUNTER — TELEPHONE (OUTPATIENT)
Dept: CARDIOLOGY | Facility: CLINIC | Age: 50
End: 2025-02-12
Payer: COMMERCIAL

## 2025-02-12 DIAGNOSIS — I25.10 ATHEROSCLEROSIS OF NATIVE CORONARY ARTERY OF NATIVE HEART WITHOUT ANGINA PECTORIS: Primary | ICD-10-CM

## 2025-02-12 DIAGNOSIS — I10 BENIGN ESSENTIAL HYPERTENSION: ICD-10-CM

## 2025-02-12 RX ORDER — METOPROLOL TARTRATE 25 MG/1
25 TABLET, FILM COATED ORAL 2 TIMES DAILY
Qty: 180 TABLET | Refills: 3 | Status: SHIPPED | OUTPATIENT
Start: 2025-02-12

## 2025-03-28 DIAGNOSIS — E03.9 HYPOTHYROIDISM, UNSPECIFIED TYPE: ICD-10-CM

## 2025-03-28 RX ORDER — LEVOTHYROXINE SODIUM 50 UG/1
50 TABLET ORAL DAILY
Qty: 90 TABLET | Refills: 0 | Status: SHIPPED | OUTPATIENT
Start: 2025-03-28

## 2025-04-05 ENCOUNTER — APPOINTMENT (OUTPATIENT)
Dept: PRIMARY CARE | Facility: CLINIC | Age: 50
End: 2025-04-05
Payer: COMMERCIAL

## 2025-04-09 ENCOUNTER — OFFICE VISIT (OUTPATIENT)
Dept: CARDIOLOGY | Facility: HOSPITAL | Age: 50
End: 2025-04-09
Payer: COMMERCIAL

## 2025-04-09 ENCOUNTER — HOSPITAL ENCOUNTER (OUTPATIENT)
Dept: CARDIOLOGY | Facility: HOSPITAL | Age: 50
Discharge: HOME | End: 2025-04-09
Payer: COMMERCIAL

## 2025-04-09 VITALS
WEIGHT: 206 LBS | SYSTOLIC BLOOD PRESSURE: 128 MMHG | DIASTOLIC BLOOD PRESSURE: 72 MMHG | HEART RATE: 92 BPM | BODY MASS INDEX: 30.51 KG/M2 | HEIGHT: 69 IN | OXYGEN SATURATION: 99 %

## 2025-04-09 DIAGNOSIS — I10 BENIGN ESSENTIAL HYPERTENSION: ICD-10-CM

## 2025-04-09 DIAGNOSIS — R93.1 ABNORMAL HEART SCORE CT: ICD-10-CM

## 2025-04-09 DIAGNOSIS — I25.10 ATHEROSCLEROSIS OF NATIVE CORONARY ARTERY OF NATIVE HEART WITHOUT ANGINA PECTORIS: ICD-10-CM

## 2025-04-09 DIAGNOSIS — E78.00 PURE HYPERCHOLESTEROLEMIA: ICD-10-CM

## 2025-04-09 DIAGNOSIS — Z86.39 HISTORY OF HYPOTHYROIDISM: Primary | ICD-10-CM

## 2025-04-09 PROCEDURE — 3008F BODY MASS INDEX DOCD: CPT | Performed by: NURSE PRACTITIONER

## 2025-04-09 PROCEDURE — 3074F SYST BP LT 130 MM HG: CPT | Performed by: NURSE PRACTITIONER

## 2025-04-09 PROCEDURE — 93005 ELECTROCARDIOGRAM TRACING: CPT | Performed by: NURSE PRACTITIONER

## 2025-04-09 PROCEDURE — 99214 OFFICE O/P EST MOD 30 MIN: CPT | Performed by: NURSE PRACTITIONER

## 2025-04-09 PROCEDURE — 1036F TOBACCO NON-USER: CPT | Performed by: NURSE PRACTITIONER

## 2025-04-09 PROCEDURE — 93017 CV STRESS TEST TRACING ONLY: CPT

## 2025-04-09 PROCEDURE — 93016 CV STRESS TEST SUPVJ ONLY: CPT | Performed by: INTERNAL MEDICINE

## 2025-04-09 PROCEDURE — 93018 CV STRESS TEST I&R ONLY: CPT | Performed by: INTERNAL MEDICINE

## 2025-04-09 PROCEDURE — 93010 ELECTROCARDIOGRAM REPORT: CPT | Performed by: INTERNAL MEDICINE

## 2025-04-09 PROCEDURE — 3078F DIAST BP <80 MM HG: CPT | Performed by: NURSE PRACTITIONER

## 2025-04-09 PROCEDURE — 4010F ACE/ARB THERAPY RXD/TAKEN: CPT | Performed by: NURSE PRACTITIONER

## 2025-04-09 ASSESSMENT — ENCOUNTER SYMPTOMS
CARDIOVASCULAR NEGATIVE: 1
RESPIRATORY NEGATIVE: 1
CONSTITUTIONAL NEGATIVE: 1

## 2025-04-09 NOTE — PROGRESS NOTES
Subjective   Buster Sims is a 49 y.o. male.    Chief Complaint:  No chief complaint on file.    HPI  Mr. Sims is seen for a 6-month follow-up as well as stress test.  He is seen in collaboration with Dr. Jo.  He is accompanied by his wife.  On preliminary review his stress test shows no signs of ischemia.  He has been feeling well with the exception of some sciatic type leg discomfort.  This did not bother him on the treadmill stress test however.  He has no cardiovascular complaints.  He is compliant with his medication and appears to tolerate them well.  He denies any recent hospitalizations or ED visits.  He continues to exercise and tries to follow a prudent diet however does admit to needing to improve his glucose.      Review of Systems   Constitutional: Negative.   Cardiovascular: Negative.    Respiratory: Negative.     All other systems reviewed and are negative.      Objective   Vitals reviewed.   Constitutional:       Appearance: Healthy appearance. Not in distress.   Neck:      Vascular: No JVR. JVD normal.   Pulmonary:      Effort: Pulmonary effort is normal.      Breath sounds: Normal breath sounds. No wheezing. No rhonchi. No rales.   Chest:      Chest wall: Not tender to palpatation.   Cardiovascular:      Normal rate. Regular rhythm. Normal S1. Normal S2.       Murmurs: There is no murmur.      No gallop.  No click. No rub.   Pulses:     Intact distal pulses.   Edema:     Peripheral edema absent.   Abdominal:      Tenderness: There is no abdominal tenderness.   Musculoskeletal: Normal range of motion.         General: No tenderness. Skin:     General: Skin is warm and dry.   Neurological:      General: No focal deficit present.      Mental Status: Alert and oriented to person, place and time.         Lab Review:   Lab Results   Component Value Date     12/18/2024    K 4.0 12/18/2024     12/18/2024    CO2 26 12/18/2024    BUN 10 12/18/2024    CREATININE 0.96 12/18/2024     GLUCOSE 107 (H) 12/18/2024    CALCIUM 9.3 12/18/2024     Lab Results   Component Value Date    WBC 11.1 12/18/2024    HGB 16.5 12/18/2024    HCT 47.1 12/18/2024    MCV 82 12/18/2024     12/18/2024     Lab Results   Component Value Date    CHOL 117 12/18/2024    TRIG 51 12/18/2024    HDL 44.9 12/18/2024     ECG obtained and reviewed, shows a normal sinus rhythm with minimal voltage criteria for LVH and a nonspecific T wave abnormality with a ventricular rate of 92 bpm.      Assessment/Plan   The encounter diagnosis was History of hypothyroidism.  Mr. Sims is a very pleasant 49 year old male with a past medical history significant for coronary artery disease by cath 4/2/2022 showing a moderate mid LAD lesion and normal LVEF secondary to CACS of 1540, hypertension, hyperlipidemia and type II diabetes mellitus.  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  He is stable from a cardiovascular standpoint.  ECG is stable.  Vital signs are stable.  We reviewed recent labs which include a fasting lipid panel at goal.  He is due for another which will be obtained in conjunction with labs ordered from his PCP. We discussed diet and continued exercise. He will continue all medication unchanged.  I would like him to followup in another 6 months.  We can discuss lab results by phone. He knows to call for any interim concerns or questions.

## 2025-04-10 LAB
ATRIAL RATE: 92 BPM
P AXIS: 39 DEGREES
P OFFSET: 191 MS
P ONSET: 135 MS
PR INTERVAL: 156 MS
Q ONSET: 213 MS
QRS COUNT: 15 BEATS
QRS DURATION: 92 MS
QT INTERVAL: 348 MS
QTC CALCULATION(BAZETT): 430 MS
QTC FREDERICIA: 401 MS
R AXIS: -46 DEGREES
T AXIS: -8 DEGREES
T OFFSET: 387 MS
VENTRICULAR RATE: 92 BPM

## 2025-06-19 ENCOUNTER — APPOINTMENT (OUTPATIENT)
Dept: ENDOCRINOLOGY | Facility: CLINIC | Age: 50
End: 2025-06-19
Payer: COMMERCIAL

## 2025-07-02 DIAGNOSIS — E11.9 TYPE 2 DIABETES MELLITUS WITHOUT COMPLICATION, WITHOUT LONG-TERM CURRENT USE OF INSULIN: ICD-10-CM

## 2025-07-03 DIAGNOSIS — E11.9 TYPE 2 DIABETES MELLITUS WITHOUT COMPLICATION, WITHOUT LONG-TERM CURRENT USE OF INSULIN: ICD-10-CM

## 2025-07-03 RX ORDER — EMPAGLIFLOZIN 25 MG/1
25 TABLET, FILM COATED ORAL DAILY
Qty: 90 TABLET | Refills: 1 | Status: SHIPPED | OUTPATIENT
Start: 2025-07-03

## 2025-07-05 RX ORDER — SEMAGLUTIDE 2.68 MG/ML
2 INJECTION, SOLUTION SUBCUTANEOUS
Qty: 9 ML | Refills: 0 | Status: SHIPPED | OUTPATIENT
Start: 2025-07-06

## 2025-08-20 DIAGNOSIS — E08.00 DIABETES MELLITUS DUE TO UNDERLYING CONDITION WITH HYPEROSMOLARITY WITHOUT COMA, WITHOUT LONG-TERM CURRENT USE OF INSULIN: ICD-10-CM

## 2025-08-20 RX ORDER — METFORMIN HYDROCHLORIDE 1000 MG/1
1000 TABLET ORAL
Qty: 180 TABLET | Refills: 0 | Status: SHIPPED | OUTPATIENT
Start: 2025-08-20

## 2025-10-15 ENCOUNTER — APPOINTMENT (OUTPATIENT)
Dept: CARDIOLOGY | Facility: HOSPITAL | Age: 50
End: 2025-10-15
Payer: COMMERCIAL